# Patient Record
Sex: MALE | Race: WHITE
[De-identification: names, ages, dates, MRNs, and addresses within clinical notes are randomized per-mention and may not be internally consistent; named-entity substitution may affect disease eponyms.]

---

## 2020-01-31 ENCOUNTER — HOSPITAL ENCOUNTER (OUTPATIENT)
Dept: HOSPITAL 75 - RT | Age: 75
End: 2020-01-31
Attending: NURSE PRACTITIONER
Payer: OTHER GOVERNMENT

## 2020-01-31 DIAGNOSIS — Z72.0: ICD-10-CM

## 2020-01-31 DIAGNOSIS — J43.9: Primary | ICD-10-CM

## 2020-01-31 PROCEDURE — 94726 PLETHYSMOGRAPHY LUNG VOLUMES: CPT

## 2020-01-31 PROCEDURE — 94060 EVALUATION OF WHEEZING: CPT

## 2020-01-31 PROCEDURE — 94729 DIFFUSING CAPACITY: CPT

## 2020-02-04 ENCOUNTER — HOSPITAL ENCOUNTER (OUTPATIENT)
Dept: HOSPITAL 75 - RAD | Age: 75
End: 2020-02-04
Attending: NURSE PRACTITIONER
Payer: OTHER GOVERNMENT

## 2020-02-04 DIAGNOSIS — Z72.0: ICD-10-CM

## 2020-02-04 DIAGNOSIS — R05: ICD-10-CM

## 2020-02-04 DIAGNOSIS — J44.9: Primary | ICD-10-CM

## 2020-02-04 DIAGNOSIS — R91.8: ICD-10-CM

## 2020-02-04 NOTE — DIAGNOSTIC IMAGING REPORT
INDICATION: Lung mass and cough.



TECHNIQUE: Serum blood glucose level at the time of injection is

90 mg/dL. Patient was administered 15.3 mCi F-18 FDG

intravenously and PET imaging was performed from the top of skull

to mid thighs. Noncontrast CT was also performed for attenuation

correction and anatomic correlation.

All CT scans use one or more of the following dose optimizing

techniques: automated exposure control, MA and/or KvP adjustment

based on patient size and exam type or iterative reconstruction.



COMPARISON: No prior PET CT studies available for comparison.



FINDINGS: There is symmetric activity within the brain. Soft

tissues of the neck are unremarkable. There is a spiculated

hypermetabolic nodule in the medial left upper lobe measuring 9

mm in size. SUV max is 7.3 and this is concerning for small lung

neoplasm. There is a second tiny hypermetabolic nodule in the

left upper lobe slightly more inferior. This is just lateral to

the aortic arch and demonstrates some activity with SUV max of

4.1. The remainder of the lung fields are unremarkable. No

mediastinal or hilar hypermetabolism is identified. Imaging to

the abdomen and pelvis does show physiologic activity within the

gastrointestinal and genitourinary tracts. There is an area of

hypermetabolism in the left suprarenal location. This is lateral

to the left adrenal gland and posterior to the stomach and

slightly cephalad to the pancreatic tail. This demonstrates an

SUV max of 16.9. Ill-defined soft tissue is identified on the

noncontrast CT. No other suspicious foci are identified.



IMPRESSION:

1. 9 mm spiculated left upper lobe nodule demonstrating

hypermetabolism and suspicious for a small lung neoplasm. There

is a second 6 mm nodule in the left upper lobe that also

demonstrates hypermetabolism and this too may represent a tiny

neoplastic nodule.

2. Hypermetabolic focus in the left upper quadrant of the abdomen

in the suprarenal location, indeterminate. This is just lateral

to the left adrenal gland and slightly cephalad to the pancreatic

tail. Dedicated CT abdomen and pelvis would be recommended for

further evaluation.



Dictated by: 



  Dictated on workstation # [id number]

## 2020-02-05 ENCOUNTER — HOSPITAL ENCOUNTER (OUTPATIENT)
Dept: HOSPITAL 75 - PREOP | Age: 75
Discharge: HOME | End: 2020-02-05
Attending: INTERNAL MEDICINE
Payer: OTHER GOVERNMENT

## 2020-02-05 VITALS — WEIGHT: 140.21 LBS | BODY MASS INDEX: 21.25 KG/M2 | HEIGHT: 67.99 IN

## 2020-02-05 DIAGNOSIS — Z01.818: Primary | ICD-10-CM

## 2020-02-10 ENCOUNTER — HOSPITAL ENCOUNTER (OUTPATIENT)
Dept: HOSPITAL 75 - RAD FS | Age: 75
End: 2020-02-10
Attending: INTERNAL MEDICINE
Payer: OTHER GOVERNMENT

## 2020-02-10 DIAGNOSIS — K86.89: Primary | ICD-10-CM

## 2020-02-10 DIAGNOSIS — N28.1: ICD-10-CM

## 2020-02-10 PROCEDURE — 74178 CT ABD&PLV WO CNTR FLWD CNTR: CPT

## 2020-02-10 NOTE — DIAGNOSTIC IMAGING REPORT
PROCEDURE: 

CT abdomen and pelvis with and without contrast.



TECHNIQUE: 

Precontrast acquisitions were acquired through the abdomen and

pelvis. Multiple contiguous axial images were obtained through

the abdomen and pelvis after the administration of intravenous

contrast. Auto Exposure Controls were utilized during the CT exam

to meet ALARA standards for radiation dose reduction. 



INDICATION:  

Pancreatic mass.



FINDINGS: 

The lung bases are clear. The liver is normal in size without

focal lesions. The gallbladder is unremarkable. There is no

biliary ductal dilatation. The spleen is normal. There is a 2.3

cm low-density mass in the tail of the pancreas. The adrenal

glands are unremarkable. The kidneys are normal in appearance.

There is some atherosclerotic calcification of the aorta which is

otherwise nonaneurysmal. The bowel gas pattern is nonspecific.

There are small cysts in the kidneys. There is no free air. There

is no ascites. There are no focal inflammatory changes. There is

no pelvic mass, adenopathy, or free fluid. There are mild

degenerative changes in the spine.



IMPRESSION: 

There is a 2.3 cm low-density mass in the tail of the pancreas,

suspect for pancreatic neoplasm.



Small bilateral renal cysts.











Dictated by: 



  Dictated on workstation # [id number]

## 2020-04-14 LAB
ALBUMIN SERPL-MCNC: 3.4 GM/DL (ref 3.2–4.5)
ALP SERPL-CCNC: 60 U/L (ref 40–136)
ALT SERPL-CCNC: 52 U/L (ref 0–55)
BASOPHILS # BLD AUTO: 0.1 10^3/UL (ref 0–0.1)
BASOPHILS NFR BLD AUTO: 1 % (ref 0–10)
BILIRUB SERPL-MCNC: 0.5 MG/DL (ref 0.1–1)
BUN/CREAT SERPL: 16
CALCIUM SERPL-MCNC: 8.8 MG/DL (ref 8.5–10.1)
CHLORIDE SERPL-SCNC: 98 MMOL/L (ref 98–107)
CO2 SERPL-SCNC: 23 MMOL/L (ref 21–32)
CREAT SERPL-MCNC: 0.75 MG/DL (ref 0.6–1.3)
EOSINOPHIL # BLD AUTO: 0.1 10^3/UL (ref 0–0.3)
EOSINOPHIL NFR BLD AUTO: 1 % (ref 0–10)
ERYTHROCYTE [DISTWIDTH] IN BLOOD BY AUTOMATED COUNT: 21.7 % (ref 10–14.5)
GFR SERPLBLD BASED ON 1.73 SQ M-ARVRAT: > 60 ML/MIN
GLUCOSE SERPL-MCNC: 120 MG/DL (ref 70–105)
HCT VFR BLD CALC: 35 % (ref 40–54)
HGB BLD-MCNC: 12.1 G/DL (ref 13.3–17.7)
LYMPHOCYTES # BLD AUTO: 1.3 X 10^3 (ref 1–4)
LYMPHOCYTES NFR BLD AUTO: 10 % (ref 12–44)
MANUAL DIFFERENTIAL PERFORMED BLD QL: NO
MCH RBC QN AUTO: 28 PG (ref 25–34)
MCHC RBC AUTO-ENTMCNC: 34 G/DL (ref 32–36)
MCV RBC AUTO: 82 FL (ref 80–99)
MONOCYTES # BLD AUTO: 2.8 X 10^3 (ref 0–1)
MONOCYTES NFR BLD AUTO: 21 % (ref 0–12)
NEUTROPHILS # BLD AUTO: 9.2 X 10^3 (ref 1.8–7.8)
NEUTROPHILS NFR BLD AUTO: 68 % (ref 42–75)
PLATELET # BLD: 147 10^3/UL (ref 130–400)
PMV BLD AUTO: (no result) FL (ref 7.4–10.4)
POTASSIUM SERPL-SCNC: 4.3 MMOL/L (ref 3.6–5)
PROT SERPL-MCNC: 6.4 GM/DL (ref 6.4–8.2)
SODIUM SERPL-SCNC: 131 MMOL/L (ref 135–145)
WBC # BLD AUTO: 13.6 10^3/UL (ref 4.3–11)

## 2020-04-22 LAB
ALBUMIN SERPL-MCNC: 3.5 GM/DL (ref 3.2–4.5)
ALP SERPL-CCNC: 53 U/L (ref 40–136)
ALT SERPL-CCNC: 44 U/L (ref 0–55)
BASOPHILS # BLD AUTO: 0.1 10^3/UL (ref 0–0.1)
BASOPHILS NFR BLD AUTO: 0 % (ref 0–10)
BILIRUB SERPL-MCNC: 0.3 MG/DL (ref 0.1–1)
BUN/CREAT SERPL: 19
CALCIUM SERPL-MCNC: 9.3 MG/DL (ref 8.5–10.1)
CHLORIDE SERPL-SCNC: 98 MMOL/L (ref 98–107)
CO2 SERPL-SCNC: 27 MMOL/L (ref 21–32)
CREAT SERPL-MCNC: 0.73 MG/DL (ref 0.6–1.3)
EOSINOPHIL # BLD AUTO: 0.1 10^3/UL (ref 0–0.3)
EOSINOPHIL NFR BLD AUTO: 1 % (ref 0–10)
ERYTHROCYTE [DISTWIDTH] IN BLOOD BY AUTOMATED COUNT: 22.1 % (ref 10–14.5)
GFR SERPLBLD BASED ON 1.73 SQ M-ARVRAT: > 60 ML/MIN
GLUCOSE SERPL-MCNC: 68 MG/DL (ref 70–105)
HCT VFR BLD CALC: 36 % (ref 40–54)
HGB BLD-MCNC: 12.2 G/DL (ref 13.3–17.7)
LYMPHOCYTES # BLD AUTO: 1.9 X 10^3 (ref 1–4)
LYMPHOCYTES NFR BLD AUTO: 13 % (ref 12–44)
MANUAL DIFFERENTIAL PERFORMED BLD QL: NO
MCH RBC QN AUTO: 28 PG (ref 25–34)
MCHC RBC AUTO-ENTMCNC: 34 G/DL (ref 32–36)
MCV RBC AUTO: 84 FL (ref 80–99)
MONOCYTES # BLD AUTO: 3 X 10^3 (ref 0–1)
MONOCYTES NFR BLD AUTO: 20 % (ref 0–12)
NEUTROPHILS # BLD AUTO: 9.6 X 10^3 (ref 1.8–7.8)
NEUTROPHILS NFR BLD AUTO: 65 % (ref 42–75)
PLATELET # BLD: 135 10^3/UL (ref 130–400)
PMV BLD AUTO: (no result) FL (ref 7.4–10.4)
POTASSIUM SERPL-SCNC: 4.6 MMOL/L (ref 3.6–5)
PROT SERPL-MCNC: 6.8 GM/DL (ref 6.4–8.2)
SODIUM SERPL-SCNC: 134 MMOL/L (ref 135–145)
WBC # BLD AUTO: 14.7 10^3/UL (ref 4.3–11)

## 2020-04-23 ENCOUNTER — HOSPITAL ENCOUNTER (OUTPATIENT)
Dept: HOSPITAL 75 - SDC | Age: 75
Discharge: HOME | End: 2020-04-23
Attending: SURGERY
Payer: OTHER GOVERNMENT

## 2020-04-23 VITALS — SYSTOLIC BLOOD PRESSURE: 123 MMHG | DIASTOLIC BLOOD PRESSURE: 67 MMHG

## 2020-04-23 VITALS — SYSTOLIC BLOOD PRESSURE: 121 MMHG | DIASTOLIC BLOOD PRESSURE: 71 MMHG

## 2020-04-23 VITALS — BODY MASS INDEX: 20.01 KG/M2 | HEIGHT: 67.99 IN | WEIGHT: 132.06 LBS

## 2020-04-23 VITALS — SYSTOLIC BLOOD PRESSURE: 118 MMHG | DIASTOLIC BLOOD PRESSURE: 62 MMHG

## 2020-04-23 VITALS — DIASTOLIC BLOOD PRESSURE: 69 MMHG | SYSTOLIC BLOOD PRESSURE: 115 MMHG

## 2020-04-23 VITALS — DIASTOLIC BLOOD PRESSURE: 59 MMHG | SYSTOLIC BLOOD PRESSURE: 110 MMHG

## 2020-04-23 VITALS — DIASTOLIC BLOOD PRESSURE: 63 MMHG | SYSTOLIC BLOOD PRESSURE: 118 MMHG

## 2020-04-23 DIAGNOSIS — Z80.0: ICD-10-CM

## 2020-04-23 DIAGNOSIS — I87.2: ICD-10-CM

## 2020-04-23 DIAGNOSIS — Z90.89: ICD-10-CM

## 2020-04-23 DIAGNOSIS — C78.7: ICD-10-CM

## 2020-04-23 DIAGNOSIS — Z79.899: ICD-10-CM

## 2020-04-23 DIAGNOSIS — J43.9: ICD-10-CM

## 2020-04-23 DIAGNOSIS — F17.210: ICD-10-CM

## 2020-04-23 DIAGNOSIS — C25.9: Primary | ICD-10-CM

## 2020-04-23 PROCEDURE — 71045 X-RAY EXAM CHEST 1 VIEW: CPT

## 2020-04-23 PROCEDURE — 87081 CULTURE SCREEN ONLY: CPT

## 2020-04-23 NOTE — DISCHARGE INST-SIMPLE/STANDARD
Discharge Inst-Standard


Patient Instructions/Follow Up


Plan of Care/Instructions/FU:  


Ice pack on 15 min and off 30 min and repeat for discomfort and to reduce


swelling.





Phil 2-3 weeks.


Activity as Tolerated:  No


Discharge Diet:  Regular Diet


Other Inst to Patient


Follow up Appt:


Make appointment for 2 week.





Instructions:


No lifting greater than 10 pounds.


No strenuous activity. 


May shower in 24 hours, no tub bath or soaking.


Use incentive spirometer at home as directed.


No Smoking





Skin/Wound Care:


You have special glue over your incision that will fall off on it's own. 





Symptoms to Report:


Appetite Changes, Extremity Discoloration, Numbness/Tingling, Swelling 

Increased, Bleeding Excessive, Eyesight Changes, Pain Increased, Urine Color 

Change, Constipation(Persistent), Fever over 101 degree F, Pain/Pressure in 

chest, Urinating Difficulty, Cough Up/Vomit Blood, Heart Beat Irreg/Pounding, 

Pain/Pressure in jaw, Vaginal Bleeding Increase, Cramps in feet or legs, 

Lightheadedness, Pain/Pressure in shoulder, Diarrhea(Persistent), Memory Changes

Suddenly, Questions/Concerns, Weight gain consecutive days, Dizziness/Fainting, 

Nausea/Vomiting, Shortness of Breath, Weight gain over 2 pounds








If questions or concerns contact your physician 


Or seek help at emergency department.











RADHA CHOW DO              Apr 23, 2020 09:38

## 2020-04-23 NOTE — PROGRESS NOTE-POST OPERATIVE
Post-Operative Progess Note


Surgeon (s)/Assistant (s)


Surgeon


RADHA CHOW DO


Assistant:  na





Pre-Operative Diagnosis


pancreatic ca c mets





Post-Operative Diagnosis





same





Procedure & Operative Findings


Date of Procedure


4/23/20


Procedure Performed/Findings





PROCEDURE:


Right internal jugular port placement using ultrasound guidance. 


 


COMPLICATIONS:


None. 


 


INDICATIONS:


The patient is a 75 year old male with metastatic pancreatic cancer. 


Patient understands the risks and benefits of port


placement and wished to proceed with the procedure. Consent was


signed on the chart. 


 


PROCEDURE:


The patient was taken to the operating suite, was prepped and


draped in the sterile fashion. A surgical pause was performed.


Ultrasound was used to locate the internal jugular vein.  Once


located anesthetic was infiltrated above it.  Using  micro-access


kit, the right internal vein was accessed. Dark nonpulsatile


blood was withdrawn. The wire was inserted.  Fluoroscopy assured


proper placement. The needle was removed.  The micro-access


dilator was advanced over the wire and the wire was removed. The


regular wire was inserted and fluoroscopy assured proper


placement. The wire was then secured.  Local anesthetic was used


to anesthetize from the neck for tunneling down to the right


chest and for pocket creation. A 15 blade scalpel was used to


make an incision over the right chest. Cautery was used to


dissect down to the pectoral fascia. A pocket was created with


blunt dissection. The dilator sheath was then advanced over the


wire under fluoroscopy and the dilator and wire were removed. The


Groshong catheter was inserted through the sheath and the sheath


was then removed. The Groshong wire was removed. The catheter was


then tunneled to the right chest pocket. Fluoroscopy was used to


cut to length and this was then attached to the port which was


then placed within the pocket. The port was then accessed without


difficulty. It was then flushed with saline and then heparin. The


subcutaneous tissues were then reapproximated using 3-0 Vicryl.


The areas were then washed and dried. Skin Affix was placed over incision.


The insertion point of the neck Skin Affix was


placed over the incision. The patient tolerated the procedure


well without complication and was taken to recovery room in


stable condition. Chest x-ray is pending.


Anesthesia Type


mac local





Estimated Blood Loss


Estimated blood loss (mL):  min





Specimens/Packing


Specimens Removed


RADHA Leung DO              Apr 23, 2020 09:45

## 2020-04-23 NOTE — PROGRESS NOTE-PRE OPERATIVE
Pre-Operative Progress Note


H&P Reviewed


The H&P was reviewed, patient examined and no changes noted.


Date Seen by Provider:  Apr 23, 2020


Time Seen by Provider:  08:39


Date H&P Reviewed:  Apr 23, 2020


Time H&P Reviewed:  08:39


Pre-Operative Diagnosis:  pancreatic ca c mets











RADHA CHOW DO              Apr 23, 2020 08:49

## 2020-04-23 NOTE — DIAGNOSTIC IMAGING REPORT
INDICATION: Port placement



COMPARISON is made study of 02/06/2020



Single AP view of the chest reveals heart size and pulmonary

vascularity within normal limits. There is no evidence of

pneumothorax. There is mild blunting of left costophrenic sulcus.

Occasional calcified granulomas are seen. Right anterior chest

wall port is in place with catheter tip reaching the upper

superior vena cava.



IMPRESSION: There maybe a small amount of left pleural fluid.

Otherwise, there is no evidence of acute abnormality or

complication post port placement.



Dictated by: 



  Dictated on workstation # [id number]

## 2020-04-23 NOTE — XMS REPORT
Continuity of Care Document

                             Created on: 2020



MAY DEAN

External Reference #: [id number]

: 1945

Sex: Male



Demographics





                          Address                   [address] [place] [address]  [id number]

 

                          Home Phone                [phone] x

 

                          Preferred Language        Unknown

 

                          Marital Status            Unknown

 

                          Confucianist Affiliation     Unknown

 

                          Race                      Unknown

 

                          Ethnic Group              Unknown





Author





                          Organization              Unknown

 

                          Address                   Unknown

 

                          Phone                     Unavailable



              



Allergies

      



             Active           Description           Code           Type         

  Severity   

                Reaction           Onset           Reported/Identified          

 

Relationship to Patient                 Clinical Status        

 

                Yes             No Known Drug Allergies           [id number]    

       Drug 

Allergy           Unknown           N/A                             2020  

      

                                                             



                  



Medications

      



There is no data.                  



Problems

      



             Date Dx Coded           Attending           Type           Code    

       

Diagnosis                               Diagnosed By        

 

                2020           GIBRAN MUHAMMAD DO           Ot            

  Z01.818          

                          ENCOUNTER FOR OTHER PREPROCEDURAL EXAMIN              

      

 

                2020           KINGSLEY LILLI E APRN           Ot      

        J44.9      

                          CHRONIC OBSTRUCTIVE PULMONARY DISEASE, U              

      

 

                2020           KINGSLEY, LILLI E APRN           Ot      

        R05        

                          COUGH                              

 

                2020           KINGSLEY, LILLI E APRN           Ot      

        R91.8      

                          OTHER NONSPECIFIC ABNORMAL FINDING OF [place]              

      

 

                2020           KINGSLEY, LILLI E APRN           Ot      

        Z72.0      

                          TOBACCO USE                        

 

                2020           KINGSLEY, LILLI E APRN           Ot      

        J43.9      

                          EMPHYSEMA, UNSPECIFIED                    

 

                2020           KINGSLEY, LILLI E APRN           Ot      

        Z72.0      

                          TOBACCO USE                        

 

                2020           KINGSLEY, LILLI E APRN           Ot      

        J43.9      

                          EMPHYSEMA, UNSPECIFIED                    

 

                2020           KINGSLEY, LILLI E APRN           Ot      

        Z72.0      

                          TOBACCO USE                        

 

                2020           KINGSLEY, LILLI E APRN           Ot      

        J44.9      

                          CHRONIC OBSTRUCTIVE PULMONARY DISEASE, U              

      

 

                2020           KINGSLEY, LILLI E APRN           Ot      

        R05        

                          COUGH                              

 

                2020           KINGSLEY, LILLI E APRN           Ot      

        R91.8      

                          OTHER NONSPECIFIC ABNORMAL FINDING OF [place]              

      

 

                2020           KINGSLEY, LILLI E APRN           Ot      

        Z72.0      

                          TOBACCO USE                        

 

                2020           GIBRAN MUHAMMAD DO           Ot            

  Z01.818          

                          ENCOUNTER FOR OTHER PREPROCEDURAL EXAMIN              

      

 

                2020           GIBRAN MUHAMMAD DO           Ot            

  F17.210          

                          NICOTINE DEPENDENCE, CIGARETTES, UNCOMPL              

      

 

             2020           GIBRAN MUHAMMAD DO           Ot           J44.

9           

CHRONIC OBSTRUCTIVE PULMONARY DISEASE, U                    

 

             2020           GIBRAN MUHAMMAD DO           Ot           R05 

          

COUGH                                            

 

                2020           GIBRAN MUHAMMAD DO           Ot            

  Z79.899          

                          OTHER LONG TERM (CURRENT) DRUG THERAPY                

    

 

             2020           GIBRAN MUHAMMAD DO           Ot           K86.

89           

OTHER SPECIFIED DISEASES OF PANCREAS                    

 

             2020           GIBRAN MUHAMMAD DO           Ot           N28.

1           

CYST OF KIDNEY, ACQUIRED                         

 

                2020           GIBRAN MUHAMMAD DO           Ot            

  F17.210          

                          NICOTINE DEPENDENCE, CIGARETTES, UNCOMPL              

      

 

             2020           GIBRAN MUHAMMAD DO           Ot           J44.

9           

CHRONIC OBSTRUCTIVE PULMONARY DISEASE, U                    

 

             2020           JB DO, GIBRAN JIN           Ot           R05 

          

COUGH                                            

 

                2020           JB DO, GIBRAN JIN           Ot            

  Z79.899          

                          OTHER LONG TERM (CURRENT) DRUG THERAPY                

    

 

                2020           GIBRAN MUHAMMAD DO           Ot            

  F17.210          

                          NICOTINE DEPENDENCE, CIGARETTES, UNCOMPL              

      

 

             2020           GIBRAN MUHAMMAD DO           Ot           J44.

9           

CHRONIC OBSTRUCTIVE PULMONARY DISEASE, U                    

 

             2020           GIBRAN MUHAMMAD DO           Ot           R05 

          

COUGH                                            

 

                2020           GIBRAN MUHAMMAD DO           Ot            

  Z79.899          

                          OTHER LONG TERM (CURRENT) DRUG THERAPY                

    

 

             2020           AUSTIN VILLAFANA           Ot           C25.2  

         

MALIGNANT NEOPLASM OF TAIL OF PANCREAS                    

 

             2020           AUSTIN VILLAFANA           Ot           D64.9  

         

ANEMIA, UNSPECIFIED                              

 

             2020           AUSTIN VILLAFANA           Ot           D69.6  

         

THROMBOCYTOPENIA, UNSPECIFIED                    

 

             2020           AUSTIN VILLAFANA           Ot           K22.70 

          

KENNEY'S ESOPHAGUS WITHOUT DYSPLASIA                    

 

             2020           AUSTIN VILLAFANA           Ot           R92.8  

         OTH 

ABN AND INCONCLUSIVE FINDINGS ON DX                     

 

             2020           AUSTIN VILLAFANA           Ot           Z72.0  

         

TOBACCO USE                                      

 

                2020           GIBRAN MUHAMMAD DO           Ot            

  F17.210          

                          NICOTINE DEPENDENCE, CIGARETTES, UNCOMPL              

      

 

             2020           GIBRAN MUHAMMAD DO           Ot           J44.

9           

CHRONIC OBSTRUCTIVE PULMONARY DISEASE, U                    

 

             2020           GIBRAN MUHAMMAD DO           Ot           R05 

          

COUGH                                            

 

                2020           JB DOGIBRAN           Ot            

  Z79.899          

                          OTHER LONG TERM (CURRENT) DRUG THERAPY                

    

 

                2020           GIBRAN MUHAMMAD DO           Ot            

  F17.210          

                          NICOTINE DEPENDENCE, CIGARETTES, UNCOMPL              

      

 

             2020           GIBRAN MUHAMMAD DO           Ot           J44.

9           

CHRONIC OBSTRUCTIVE PULMONARY DISEASE, U                    

 

             2020           JBGIBRAN HEWITT DO           Ot           R05 

          

COUGH                                            

 

                2020           JB BROWNINGGIBRAN           Ot            

  Z79.899          

                          OTHER LONG TERM (CURRENT) DRUG THERAPY                

    



                                                                                
                   



Procedures

      



There is no data.                  



Results

      



                    Test                Result              Range        

 

                                        Complete blood count (CBC) with automate

d white blood cell (WBC) differential - 

20 08:47         

 

                          Blood leukocytes automated count (number/volume)      

     6.2 10*3/uL          

                                        4.3-11.0        

 

                          Blood erythrocytes automated count (number/volume)    

       4.15 10*6/uL       

                                        4.35-5.85        

 

                    Venous blood hemoglobin measurement (mass/volume)           

11.9 g/dL           

13.3-17.7        

 

                    Blood hematocrit (volume fraction)           35 %           

     40-54        

 

                    Automated erythrocyte mean corpuscular volume           85 [

foz_us]           

80-99        

 

                                        Automated erythrocyte mean corpuscular h

emoglobin (mass per erythrocyte)        

                          29 pg                     25-34        

 

                                        Automated erythrocyte mean corpuscular h

emoglobin concentration measurement 

(mass/volume)             34 g/dL                   32-36        

 

                    Automated erythrocyte distribution width ratio           22.

9 %              10.0-

14.5        

 

                    Automated blood platelet count (count/volume)           69 1

0*3/uL           

130-400        

 

                    Automated blood neutrophils/100 leukocytes           51 %   

             42-75       

 

 

                    Automated blood lymphocytes/100 leukocytes           26 %   

             12-44       

 

 

                    Blood monocytes/100 leukocytes           19 %               

 0-12        

 

                    Automated blood eosinophils/100 leukocytes           3 %    

             0-10        

 

                    Automated blood basophils/100 leukocytes           1 %      

           0-10        

 

                    Blood neutrophils automated count (number/volume)           

3.2 10*3            

1.8-7.8        

 

                    Blood lymphocytes automated count (number/volume)           

1.6 10*3            

1.0-4.0        

 

                    Blood monocytes automated count (number/volume)           1.

2 10*3            

0.0-1.0        

 

                    Automated eosinophil count           0.2 10*3/uL           0

.0-0.3        

 

                    Automated blood basophil count (count/volume)           0.1 

10*3/uL           

0.0-0.1        

 

                                        Comprehensive metabolic panel - 20

 08:47         

 

                          Serum or plasma sodium measurement (moles/volume)     

      139 mmol/L          

                                        135-145        

 

                          Serum or plasma potassium measurement (moles/volume)  

         4.3 mmol/L       

                                        3.6-5.0        

 

                          Serum or plasma chloride measurement (moles/volume)   

        107 mmol/L        

                                                

 

                    Carbon dioxide           26 mmol/L           21-32        

 

                          Serum or plasma anion gap determination (moles/volume)

           6 mmol/L       

                                        5-14        

 

                          Serum or plasma urea nitrogen measurement (mass/volume

)           16 mg/dL      

                                        7-18        

 

                          Serum or plasma creatinine measurement (mass/volume)  

         0.87 mg/dL       

                                        0.60-1.30        

 

                    Serum or plasma urea nitrogen/creatinine mass ratio         

  18                  NRG 

       

 

                                        Serum or plasma creatinine measurement w

ith calculation of estimated glomerular 

filtration rate           >                         NRG        

 

                    Serum or plasma glucose measurement (mass/volume)           

93 mg/dL            

        

 

                    Serum or plasma calcium measurement (mass/volume)           

9.0 mg/dL           

8.5-10.1        

 

                          Serum or plasma total bilirubin measurement (mass/volu

me)           0.4 mg/dL   

                                        0.1-1.0        

 

                                        Serum or plasma alkaline phosphatase leah

surement (enzymatic activity/volume)    

                          48 U/L                            

 

                                        Serum or plasma aspartate aminotransfera

se measurement (enzymatic 

activity/volume)           15 U/L                    5-34        

 

                                        Serum or plasma alanine aminotransferase

 measurement (enzymatic activity/volume)

                          16 U/L                    0-55        

 

                    Serum or plasma protein measurement (mass/volume)           

6.2 g/dL            

6.4-8.2        

 

                    Serum or plasma albumin measurement (mass/volume)           

3.7 g/dL            

3.2-4.5        

 

                    CALCIUM CORRECTED           9.2 mg/dL           8.5-10.1    

    

 

                                        Serum or plasma amylase measurement (enz

ymatic activity/volume) - 20 08:47

         

 

                          Serum or plasma amylase measurement (enzymatic activit

y/volume)           32 U/L

                                                

 

                                        Lipase - 20 08:47         

 

                    Lipase              16 U/L              8-78        

 

                                        Manual absolute plasma cell count -  08:47         

 

                    Blood monocytes/100 leukocytes           17 %               

 NRG        

 

                    Manual blood segmented neutrophils/100 leukocytes           

64 %                NRG  

      

 

                    Blood band neutrophils/100 leukocytes           0 %         

        NRG        

 

                    Manual blood lymphocytes/100 leukocytes           12 %      

          NRG        

 

                    Manual eosinophils/100 leukocytes in nose           2 %     

            NRG        

 

                    Manual blood basophils/100 leukocytes           0 %         

        NRG        

 

                    Blood lymphocytes variant/100 leukocytes           5 %      

           NRG        

 

                    Blood anisocytosis detection by light microscopy           M

ARKED              NRG

        

 

                    Blood target cells detection by light microscopy           M

ODERATE            

NRG        

 

                    Blood spherocytes detection by light microscopy           SL

IGHT              NRG 

       

 

                    Blood schistocytes detection by light microscopy           S

LIGHT              NRG

        

 

                                        CA 19-9 - 02/06/20 08:47         

 

                    CA 19-9 C           3488 u[iU]/mL           0-37        

 

                                        Sputum Gram stain - 20 09:30      

   

 

                    Sputum Gram stain           relevant, interpret with caution

.            NRG    

    

 

                                        Bacteria identification in bronchial spe

cimen by aerobe culture - 20 09:30

         

 

                    QUANTITY OF GROWTH           .                   NRG        

 

                          Bacteria identification in bronchial specimen by aerob

e culture           

[id number]                                NRG        

 

                    FTX;REPORTABLE           OBSERVED ON PLATES AT [facility]          

  NRG        

 

                                        Mycobacterium species detection by organ

ism specific culture - 20 09:30   

      

 

                    QUANTITY OF GROWTH           FEW                 NRG        

 

                          Mycobacterium species detection by organism specific c

ulture           [id number] 

                                        NRG        

 

                                        Fungus culture - 20 09:31         

 

                    QUANTITY OF GROWTH           Rare                NRG        

 

                    Fungus culture           [id number]             NRG        



                                  



Encounters

      



                ACCT No.           Visit Date/Time           Discharge          

 Status         

             Pt. Type           Provider           Facility           Loc./Unit 

          

Complaint        

 

                    [id number]           02/10/2020 08:49:00           02/10/2

020 23:59:59        

                CLS             Outpatient           GIBRAN MUHAMMAD DO         

  [facility]           RAD FS                    PANCREATIC MASS        

 

                    [id number]           2020 07:40:00           

020 11:05:00        

                DIS             Outpatient           GIBRAN MUHAMMAD DO         

  [facility]           ENDO                      DYSPNEA/COUGH/COPD/OTHE

R 

NONSPECIFIC ABNORMAL FIND        

 

                    [id number]           2020 05:46:00            12:16:00        

                DIS             Outpatient           GIBRAN MUHAMMAD DO         

  [facility]           PREOP                     BRONCHOSCOPY        

 

                    [id number]           2020 09:17:00            23:59:59        

                CLS             Outpatient           LILLI WYNN APRN   

        [facility]           RAD                       COUGH,TOBACCO U

SER        

 

                    [id number]           2020 12:13:00           

020 23:59:59        

                CLS             Outpatient           LILLI WYNN APRN   

        [facility]           RT                        COUGH,TOBACCO U

SER        

 

             [id number]           2020 14:33:00                        A

CT           

Outpatient                AUSTIN VILLAFANA           [facility] 

                          ONC

## 2020-04-23 NOTE — ANESTHESIA-GENERAL POST-OP
MAC


Patient Condition


Mental Status/LOC:  Same as Preop


Cardiovascular:  Satisfactory


Nausea/Vomiting:  Absent


Respiratory:  Satisfactory


Pain:  Controlled


Complications:  Present





Post Op Complications


Complications


Pt had an episode of vomiting during procedure.  Airway was suctioned 

immediately and head was put in reverse T.  Advised pt via telephone after 

procedure of events and to follow up with primary care if fever/malaise or cough

in next 24 to 48 hours.  Pt understood instructions.





Follow Up Care/Instructions


Patient Instructions


None needed.





Anesthesiology Discharge Order


Discharge Order


Patient is doing well, no complaints, stable vital signs.











JOHN SAN CRNA           Apr 23, 2020 11:08

## 2020-04-23 NOTE — DIAGNOSTIC IMAGING REPORT
INDICATION: PowerPort placement



COMPARISON: Unavailable



TECHNIQUE: Single intraoperative image of the upper chest is

obtained dated 04/23/2020.



FINDINGS: Intraoperative imaging demonstrates a right-sided

Port-A-Cath in place with the distal tip extending into the

superior vena cava where the distal tip is not optimally

visualized.



IMPRESSION: 



Intraoperative imaging from placement of a right-sided

Port-A-Cath.



Recommend dedicated radiographs of the chest at the completion of

the exam to evaluate the entire catheter and to evaluate for any

possible postprocedural complication.



See surgical note for full details.



Fluoroscopy time: 32.8 seconds.



Dictated by: 



  Dictated on workstation # [id number]

## 2020-05-06 LAB
ALBUMIN SERPL-MCNC: 3.6 GM/DL (ref 3.2–4.5)
ALP SERPL-CCNC: 47 U/L (ref 40–136)
ALT SERPL-CCNC: 33 U/L (ref 0–55)
BASOPHILS # BLD AUTO: 0 10^3/UL (ref 0–0.1)
BASOPHILS NFR BLD AUTO: 1 % (ref 0–10)
BILIRUB SERPL-MCNC: 0.5 MG/DL (ref 0.1–1)
BUN/CREAT SERPL: 17
CALCIUM SERPL-MCNC: 9 MG/DL (ref 8.5–10.1)
CHLORIDE SERPL-SCNC: 102 MMOL/L (ref 98–107)
CO2 SERPL-SCNC: 23 MMOL/L (ref 21–32)
CREAT SERPL-MCNC: 0.65 MG/DL (ref 0.6–1.3)
EOSINOPHIL # BLD AUTO: 0 10^3/UL (ref 0–0.3)
EOSINOPHIL NFR BLD AUTO: 0 % (ref 0–10)
ERYTHROCYTE [DISTWIDTH] IN BLOOD BY AUTOMATED COUNT: 23.6 % (ref 10–14.5)
GFR SERPLBLD BASED ON 1.73 SQ M-ARVRAT: > 60 ML/MIN
GLUCOSE SERPL-MCNC: 88 MG/DL (ref 70–105)
HCT VFR BLD CALC: 33 % (ref 40–54)
HGB BLD-MCNC: 11.1 G/DL (ref 13.3–17.7)
LYMPHOCYTES # BLD AUTO: 1.7 X 10^3 (ref 1–4)
LYMPHOCYTES NFR BLD AUTO: 21 % (ref 12–44)
MANUAL DIFFERENTIAL PERFORMED BLD QL: NO
MCH RBC QN AUTO: 28 PG (ref 25–34)
MCHC RBC AUTO-ENTMCNC: 34 G/DL (ref 32–36)
MCV RBC AUTO: 84 FL (ref 80–99)
MONOCYTES # BLD AUTO: 1.4 X 10^3 (ref 0–1)
MONOCYTES NFR BLD AUTO: 17 % (ref 0–12)
NEUTROPHILS # BLD AUTO: 4.9 X 10^3 (ref 1.8–7.8)
NEUTROPHILS NFR BLD AUTO: 61 % (ref 42–75)
PLATELET # BLD: 114 10^3/UL (ref 130–400)
PMV BLD AUTO: (no result) FL (ref 7.4–10.4)
POTASSIUM SERPL-SCNC: 4.4 MMOL/L (ref 3.6–5)
PROT SERPL-MCNC: 6.4 GM/DL (ref 6.4–8.2)
SODIUM SERPL-SCNC: 134 MMOL/L (ref 135–145)
WBC # BLD AUTO: 8.1 10^3/UL (ref 4.3–11)

## 2020-05-13 LAB
BASOPHILS # BLD AUTO: 0 10^3/UL (ref 0–0.1)
BASOPHILS NFR BLD AUTO: 1 % (ref 0–10)
BUN/CREAT SERPL: 16
CALCIUM SERPL-MCNC: 8.7 MG/DL (ref 8.5–10.1)
CHLORIDE SERPL-SCNC: 104 MMOL/L (ref 98–107)
CO2 SERPL-SCNC: 23 MMOL/L (ref 21–32)
CREAT SERPL-MCNC: 0.68 MG/DL (ref 0.6–1.3)
EOSINOPHIL # BLD AUTO: 0 10^3/UL (ref 0–0.3)
EOSINOPHIL NFR BLD AUTO: 0 % (ref 0–10)
ERYTHROCYTE [DISTWIDTH] IN BLOOD BY AUTOMATED COUNT: 23 % (ref 10–14.5)
GFR SERPLBLD BASED ON 1.73 SQ M-ARVRAT: > 60 ML/MIN
GLUCOSE SERPL-MCNC: 110 MG/DL (ref 70–105)
HCT VFR BLD CALC: 29 % (ref 40–54)
HGB BLD-MCNC: 9.9 G/DL (ref 13.3–17.7)
LYMPHOCYTES # BLD AUTO: 1.1 X 10^3 (ref 1–4)
LYMPHOCYTES NFR BLD AUTO: 39 % (ref 12–44)
MANUAL DIFFERENTIAL PERFORMED BLD QL: NO
MCH RBC QN AUTO: 29 PG (ref 25–34)
MCHC RBC AUTO-ENTMCNC: 34 G/DL (ref 32–36)
MCV RBC AUTO: 85 FL (ref 80–99)
MONOCYTES # BLD AUTO: 0.4 X 10^3 (ref 0–1)
MONOCYTES NFR BLD AUTO: 12 % (ref 0–12)
NEUTROPHILS # BLD AUTO: 1.4 X 10^3 (ref 1.8–7.8)
NEUTROPHILS NFR BLD AUTO: 48 % (ref 42–75)
PLATELET # BLD: 44 10^3/UL (ref 130–400)
PMV BLD AUTO: (no result) FL (ref 7.4–10.4)
POTASSIUM SERPL-SCNC: 4.1 MMOL/L (ref 3.6–5)
SODIUM SERPL-SCNC: 135 MMOL/L (ref 135–145)
WBC # BLD AUTO: 2.9 10^3/UL (ref 4.3–11)

## 2020-05-20 LAB
BASOPHILS # BLD AUTO: 0 10^3/UL (ref 0–0.1)
BASOPHILS NFR BLD AUTO: 0 % (ref 0–10)
BUN/CREAT SERPL: 18
CALCIUM SERPL-MCNC: 9.2 MG/DL (ref 8.5–10.1)
CHLORIDE SERPL-SCNC: 104 MMOL/L (ref 98–107)
CO2 SERPL-SCNC: 23 MMOL/L (ref 21–32)
CREAT SERPL-MCNC: 0.74 MG/DL (ref 0.6–1.3)
EOSINOPHIL # BLD AUTO: 0 10^3/UL (ref 0–0.3)
EOSINOPHIL NFR BLD AUTO: 1 % (ref 0–10)
ERYTHROCYTE [DISTWIDTH] IN BLOOD BY AUTOMATED COUNT: 23.7 % (ref 10–14.5)
GFR SERPLBLD BASED ON 1.73 SQ M-ARVRAT: > 60 ML/MIN
GLUCOSE SERPL-MCNC: 98 MG/DL (ref 70–105)
HCT VFR BLD CALC: 32 % (ref 40–54)
HGB BLD-MCNC: 10.7 G/DL (ref 13.3–17.7)
LYMPHOCYTES # BLD AUTO: 1.5 X 10^3 (ref 1–4)
LYMPHOCYTES NFR BLD AUTO: 23 % (ref 12–44)
MANUAL DIFFERENTIAL PERFORMED BLD QL: NO
MCH RBC QN AUTO: 29 PG (ref 25–34)
MCHC RBC AUTO-ENTMCNC: 33 G/DL (ref 32–36)
MCV RBC AUTO: 86 FL (ref 80–99)
MONOCYTES # BLD AUTO: 1.8 X 10^3 (ref 0–1)
MONOCYTES NFR BLD AUTO: 27 % (ref 0–12)
NEUTROPHILS # BLD AUTO: 3.2 X 10^3 (ref 1.8–7.8)
NEUTROPHILS NFR BLD AUTO: 49 % (ref 42–75)
PLATELET # BLD: 68 10^3/UL (ref 130–400)
PMV BLD AUTO: (no result) FL (ref 7.4–10.4)
POTASSIUM SERPL-SCNC: 4.3 MMOL/L (ref 3.6–5)
SODIUM SERPL-SCNC: 136 MMOL/L (ref 135–145)
WBC # BLD AUTO: 6.5 10^3/UL (ref 4.3–11)

## 2020-05-27 ENCOUNTER — HOSPITAL ENCOUNTER (OUTPATIENT)
Dept: HOSPITAL 75 - ONC | Age: 75
LOS: 5 days | Discharge: HOME | End: 2020-06-01
Attending: INTERNAL MEDICINE
Payer: OTHER GOVERNMENT

## 2020-05-27 VITALS — BODY MASS INDEX: 20.31 KG/M2 | WEIGHT: 134 LBS | HEIGHT: 68 IN

## 2020-05-27 DIAGNOSIS — D69.6: ICD-10-CM

## 2020-05-27 DIAGNOSIS — C25.2: ICD-10-CM

## 2020-05-27 DIAGNOSIS — Z51.11: Primary | ICD-10-CM

## 2020-05-27 DIAGNOSIS — R92.8: ICD-10-CM

## 2020-05-27 DIAGNOSIS — D64.9: ICD-10-CM

## 2020-05-27 DIAGNOSIS — Z72.0: ICD-10-CM

## 2020-05-27 DIAGNOSIS — K22.70: ICD-10-CM

## 2020-05-27 LAB
ALBUMIN SERPL-MCNC: 3.7 GM/DL (ref 3.2–4.5)
ALP SERPL-CCNC: 59 U/L (ref 40–136)
ALT SERPL-CCNC: 16 U/L (ref 0–55)
BASOPHILS # BLD AUTO: 0.1 10^3/UL (ref 0–0.1)
BASOPHILS NFR BLD AUTO: 1 % (ref 0–10)
BILIRUB SERPL-MCNC: 0.4 MG/DL (ref 0.1–1)
BUN/CREAT SERPL: 18
CALCIUM SERPL-MCNC: 9 MG/DL (ref 8.5–10.1)
CHLORIDE SERPL-SCNC: 103 MMOL/L (ref 98–107)
CO2 SERPL-SCNC: 24 MMOL/L (ref 21–32)
CREAT SERPL-MCNC: 0.72 MG/DL (ref 0.6–1.3)
EOSINOPHIL # BLD AUTO: 0 10^3/UL (ref 0–0.3)
EOSINOPHIL NFR BLD AUTO: 0 % (ref 0–10)
ERYTHROCYTE [DISTWIDTH] IN BLOOD BY AUTOMATED COUNT: 23.7 % (ref 10–14.5)
GFR SERPLBLD BASED ON 1.73 SQ M-ARVRAT: > 60 ML/MIN
GLUCOSE SERPL-MCNC: 91 MG/DL (ref 70–105)
HCT VFR BLD CALC: 33 % (ref 40–54)
HGB BLD-MCNC: 10.7 G/DL (ref 13.3–17.7)
LYMPHOCYTES # BLD AUTO: 1.8 X 10^3 (ref 1–4)
LYMPHOCYTES NFR BLD AUTO: 22 % (ref 12–44)
MANUAL DIFFERENTIAL PERFORMED BLD QL: NO
MCH RBC QN AUTO: 29 PG (ref 25–34)
MCHC RBC AUTO-ENTMCNC: 33 G/DL (ref 32–36)
MCV RBC AUTO: 87 FL (ref 80–99)
MONOCYTES # BLD AUTO: 1.6 X 10^3 (ref 0–1)
MONOCYTES NFR BLD AUTO: 19 % (ref 0–12)
NEUTROPHILS # BLD AUTO: 4.8 X 10^3 (ref 1.8–7.8)
NEUTROPHILS NFR BLD AUTO: 58 % (ref 42–75)
PLATELET # BLD: 73 10^3/UL (ref 130–400)
PMV BLD AUTO: (no result) FL (ref 7.4–10.4)
POTASSIUM SERPL-SCNC: 4.5 MMOL/L (ref 3.6–5)
PROT SERPL-MCNC: 6.7 GM/DL (ref 6.4–8.2)
SODIUM SERPL-SCNC: 135 MMOL/L (ref 135–145)
WBC # BLD AUTO: 8.3 10^3/UL (ref 4.3–11)

## 2020-05-27 PROCEDURE — 96375 TX/PRO/DX INJ NEW DRUG ADDON: CPT

## 2020-05-27 PROCEDURE — 85025 COMPLETE CBC W/AUTO DIFF WBC: CPT

## 2020-05-27 PROCEDURE — 86301 IMMUNOASSAY TUMOR CA 19-9: CPT

## 2020-05-27 PROCEDURE — 96417 CHEMO IV INFUS EACH ADDL SEQ: CPT

## 2020-05-27 PROCEDURE — 99213 OFFICE O/P EST LOW 20 MIN: CPT

## 2020-05-27 PROCEDURE — 80048 BASIC METABOLIC PNL TOTAL CA: CPT

## 2020-05-27 PROCEDURE — 80053 COMPREHEN METABOLIC PANEL: CPT

## 2020-05-27 PROCEDURE — 96413 CHEMO IV INFUSION 1 HR: CPT

## 2020-05-27 PROCEDURE — 36415 COLL VENOUS BLD VENIPUNCTURE: CPT

## 2020-05-27 PROCEDURE — 99214 OFFICE O/P EST MOD 30 MIN: CPT

## 2020-05-27 PROCEDURE — 36591 DRAW BLOOD OFF VENOUS DEVICE: CPT

## 2020-06-03 LAB
BASOPHILS # BLD AUTO: 0 10^3/UL (ref 0–0.1)
BASOPHILS NFR BLD AUTO: 1 % (ref 0–10)
BUN/CREAT SERPL: 17
CALCIUM SERPL-MCNC: 8.7 MG/DL (ref 8.5–10.1)
CHLORIDE SERPL-SCNC: 104 MMOL/L (ref 98–107)
CO2 SERPL-SCNC: 22 MMOL/L (ref 21–32)
CREAT SERPL-MCNC: 0.75 MG/DL (ref 0.6–1.3)
EOSINOPHIL # BLD AUTO: 0 10^3/UL (ref 0–0.3)
EOSINOPHIL NFR BLD AUTO: 0 % (ref 0–10)
ERYTHROCYTE [DISTWIDTH] IN BLOOD BY AUTOMATED COUNT: 22.4 % (ref 10–14.5)
GFR SERPLBLD BASED ON 1.73 SQ M-ARVRAT: > 60 ML/MIN
GLUCOSE SERPL-MCNC: 85 MG/DL (ref 70–105)
HCT VFR BLD CALC: 28 % (ref 40–54)
HGB BLD-MCNC: 9.3 G/DL (ref 13.3–17.7)
LYMPHOCYTES # BLD AUTO: 1.2 X 10^3 (ref 1–4)
LYMPHOCYTES NFR BLD AUTO: 38 % (ref 12–44)
MANUAL DIFFERENTIAL PERFORMED BLD QL: NO
MCH RBC QN AUTO: 29 PG (ref 25–34)
MCHC RBC AUTO-ENTMCNC: 33 G/DL (ref 32–36)
MCV RBC AUTO: 87 FL (ref 80–99)
MONOCYTES # BLD AUTO: 0.4 X 10^3 (ref 0–1)
MONOCYTES NFR BLD AUTO: 13 % (ref 0–12)
NEUTROPHILS # BLD AUTO: 1.5 X 10^3 (ref 1.8–7.8)
NEUTROPHILS NFR BLD AUTO: 48 % (ref 42–75)
PLATELET # BLD: 27 10^3/UL (ref 130–400)
PMV BLD AUTO: (no result) FL (ref 7.4–10.4)
POTASSIUM SERPL-SCNC: 4.3 MMOL/L (ref 3.6–5)
SODIUM SERPL-SCNC: 135 MMOL/L (ref 135–145)
WBC # BLD AUTO: 3.1 10^3/UL (ref 4.3–11)

## 2020-06-10 LAB
BASOPHILS # BLD AUTO: 0.1 10^3/UL (ref 0–0.1)
BASOPHILS NFR BLD AUTO: 2 % (ref 0–10)
BUN/CREAT SERPL: 18
CALCIUM SERPL-MCNC: 9.4 MG/DL (ref 8.5–10.1)
CHLORIDE SERPL-SCNC: 100 MMOL/L (ref 98–107)
CO2 SERPL-SCNC: 25 MMOL/L (ref 21–32)
CREAT SERPL-MCNC: 0.85 MG/DL (ref 0.6–1.3)
EOSINOPHIL # BLD AUTO: 0.1 10^3/UL (ref 0–0.3)
EOSINOPHIL NFR BLD AUTO: 1 % (ref 0–10)
ERYTHROCYTE [DISTWIDTH] IN BLOOD BY AUTOMATED COUNT: 26.1 % (ref 10–14.5)
GFR SERPLBLD BASED ON 1.73 SQ M-ARVRAT: > 60 ML/MIN
GLUCOSE SERPL-MCNC: 109 MG/DL (ref 70–105)
HCT VFR BLD CALC: 51 % (ref 40–54)
HGB BLD-MCNC: 16.7 G/DL (ref 13.3–17.7)
LYMPHOCYTES # BLD AUTO: 1.9 X 10^3 (ref 1–4)
LYMPHOCYTES NFR BLD AUTO: 28 % (ref 12–44)
MANUAL DIFFERENTIAL PERFORMED BLD QL: NO
MCH RBC QN AUTO: 29 PG (ref 25–34)
MCHC RBC AUTO-ENTMCNC: 33 G/DL (ref 32–36)
MCV RBC AUTO: 89 FL (ref 80–99)
MONOCYTES # BLD AUTO: 1.4 X 10^3 (ref 0–1)
MONOCYTES NFR BLD AUTO: 21 % (ref 0–12)
NEUTROPHILS # BLD AUTO: 3.3 X 10^3 (ref 1.8–7.8)
NEUTROPHILS NFR BLD AUTO: 49 % (ref 42–75)
PLATELET # BLD: 96 10^3/UL (ref 130–400)
PMV BLD AUTO: (no result) FL (ref 7.4–10.4)
POTASSIUM SERPL-SCNC: 4.7 MMOL/L (ref 3.6–5)
SODIUM SERPL-SCNC: 137 MMOL/L (ref 135–145)
WBC # BLD AUTO: 6.8 10^3/UL (ref 4.3–11)

## 2020-06-17 LAB
ALBUMIN SERPL-MCNC: 3.7 GM/DL (ref 3.2–4.5)
ALP SERPL-CCNC: 63 U/L (ref 40–136)
ALT SERPL-CCNC: 14 U/L (ref 0–55)
BASOPHILS # BLD AUTO: 0.1 10^3/UL (ref 0–0.1)
BASOPHILS NFR BLD AUTO: 1 % (ref 0–10)
BILIRUB SERPL-MCNC: 0.4 MG/DL (ref 0.1–1)
BUN/CREAT SERPL: 19
CALCIUM SERPL-MCNC: 9.1 MG/DL (ref 8.5–10.1)
CHLORIDE SERPL-SCNC: 102 MMOL/L (ref 98–107)
CO2 SERPL-SCNC: 24 MMOL/L (ref 21–32)
CREAT SERPL-MCNC: 0.72 MG/DL (ref 0.6–1.3)
EOSINOPHIL # BLD AUTO: 0.1 10^3/UL (ref 0–0.3)
EOSINOPHIL NFR BLD AUTO: 1 % (ref 0–10)
ERYTHROCYTE [DISTWIDTH] IN BLOOD BY AUTOMATED COUNT: 22.7 % (ref 10–14.5)
GFR SERPLBLD BASED ON 1.73 SQ M-ARVRAT: > 60 ML/MIN
GLUCOSE SERPL-MCNC: 89 MG/DL (ref 70–105)
HCT VFR BLD CALC: 34 % (ref 40–54)
HGB BLD-MCNC: 11.4 G/DL (ref 13.3–17.7)
LYMPHOCYTES # BLD AUTO: 2.2 X 10^3 (ref 1–4)
LYMPHOCYTES NFR BLD AUTO: 23 % (ref 12–44)
MANUAL DIFFERENTIAL PERFORMED BLD QL: NO
MCH RBC QN AUTO: 30 PG (ref 25–34)
MCHC RBC AUTO-ENTMCNC: 33 G/DL (ref 32–36)
MCV RBC AUTO: 88 FL (ref 80–99)
MONOCYTES # BLD AUTO: 2 X 10^3 (ref 0–1)
MONOCYTES NFR BLD AUTO: 21 % (ref 0–12)
NEUTROPHILS # BLD AUTO: 5.4 X 10^3 (ref 1.8–7.8)
NEUTROPHILS NFR BLD AUTO: 56 % (ref 42–75)
PLATELET # BLD: 84 10^3/UL (ref 130–400)
PMV BLD AUTO: (no result) FL (ref 7.4–10.4)
POTASSIUM SERPL-SCNC: 4.3 MMOL/L (ref 3.6–5)
PROT SERPL-MCNC: 6.7 GM/DL (ref 6.4–8.2)
SODIUM SERPL-SCNC: 133 MMOL/L (ref 135–145)
WBC # BLD AUTO: 9.7 10^3/UL (ref 4.3–11)

## 2020-06-24 ENCOUNTER — HOSPITAL ENCOUNTER (OUTPATIENT)
Dept: HOSPITAL 75 - LAB FS | Age: 75
LOS: 90 days | Discharge: HOME | End: 2020-09-22
Attending: INTERNAL MEDICINE
Payer: OTHER GOVERNMENT

## 2020-06-24 DIAGNOSIS — R91.8: ICD-10-CM

## 2020-06-24 DIAGNOSIS — C78.7: ICD-10-CM

## 2020-06-24 DIAGNOSIS — C25.2: Primary | ICD-10-CM

## 2020-06-24 LAB
BASOPHILS # BLD AUTO: 0.1 10^3/UL (ref 0–0.1)
BASOPHILS NFR BLD AUTO: 2 % (ref 0–10)
BUN/CREAT SERPL: 17
CALCIUM SERPL-MCNC: 9.3 MG/DL (ref 8.5–10.1)
CHLORIDE SERPL-SCNC: 99 MMOL/L (ref 98–107)
CO2 SERPL-SCNC: 24 MMOL/L (ref 21–32)
CREAT SERPL-MCNC: 0.69 MG/DL (ref 0.6–1.3)
EOSINOPHIL # BLD AUTO: 0 10^3/UL (ref 0–0.3)
EOSINOPHIL NFR BLD AUTO: 1 % (ref 0–10)
GFR SERPLBLD BASED ON 1.73 SQ M-ARVRAT: > 60 ML/MIN
GLUCOSE SERPL-MCNC: 103 MG/DL (ref 70–105)
HCT VFR BLD CALC: 33 % (ref 40–54)
HGB BLD-MCNC: 11 G/DL (ref 13.3–17.7)
LYMPHOCYTES # BLD AUTO: 1.6 X 10^3 (ref 1–4)
LYMPHOCYTES NFR BLD AUTO: 43 % (ref 12–44)
MANUAL DIFFERENTIAL PERFORMED BLD QL: NO
MCH RBC QN AUTO: 30 PG (ref 25–34)
MCHC RBC AUTO-ENTMCNC: 33 G/DL (ref 32–36)
MCV RBC AUTO: 88 FL (ref 80–99)
MONOCYTES # BLD AUTO: 0.5 X 10^3 (ref 0–1)
MONOCYTES NFR BLD AUTO: 14 % (ref 0–12)
NEUTROPHILS # BLD AUTO: 1.6 X 10^3 (ref 1.8–7.8)
NEUTROPHILS NFR BLD AUTO: 41 % (ref 42–75)
PLATELET # BLD: 55 10^3/UL (ref 130–400)
PMV BLD AUTO: (no result) FL (ref 7.4–10.4)
POTASSIUM SERPL-SCNC: 4.6 MMOL/L (ref 3.6–5)
SODIUM SERPL-SCNC: 135 MMOL/L (ref 135–145)
WBC # BLD AUTO: 3.8 10^3/UL (ref 4.3–11)

## 2020-06-24 PROCEDURE — 80048 BASIC METABOLIC PNL TOTAL CA: CPT

## 2020-06-24 PROCEDURE — 85025 COMPLETE CBC W/AUTO DIFF WBC: CPT

## 2020-06-24 PROCEDURE — 36415 COLL VENOUS BLD VENIPUNCTURE: CPT

## 2020-07-02 LAB
BASOPHILS # BLD AUTO: 0 10^3/UL (ref 0–0.1)
BASOPHILS NFR BLD AUTO: 0 % (ref 0–10)
BUN/CREAT SERPL: 18
CALCIUM SERPL-MCNC: 9 MG/DL (ref 8.5–10.1)
CHLORIDE SERPL-SCNC: 105 MMOL/L (ref 98–107)
CO2 SERPL-SCNC: 22 MMOL/L (ref 21–32)
CREAT SERPL-MCNC: 0.68 MG/DL (ref 0.6–1.3)
EOSINOPHIL # BLD AUTO: 0.1 10^3/UL (ref 0–0.3)
EOSINOPHIL NFR BLD AUTO: 1 % (ref 0–10)
ERYTHROCYTE [DISTWIDTH] IN BLOOD BY AUTOMATED COUNT: 22.7 % (ref 10–14.5)
GFR SERPLBLD BASED ON 1.73 SQ M-ARVRAT: > 60 ML/MIN
GLUCOSE SERPL-MCNC: 91 MG/DL (ref 70–105)
HCT VFR BLD CALC: 33 % (ref 40–54)
HGB BLD-MCNC: 11 G/DL (ref 13.3–17.7)
LYMPHOCYTES # BLD AUTO: 1.7 X 10^3 (ref 1–4)
LYMPHOCYTES NFR BLD AUTO: 25 % (ref 12–44)
MANUAL DIFFERENTIAL PERFORMED BLD QL: NO
MCH RBC QN AUTO: 30 PG (ref 25–34)
MCHC RBC AUTO-ENTMCNC: 33 G/DL (ref 32–36)
MCV RBC AUTO: 89 FL (ref 80–99)
MONOCYTES # BLD AUTO: 1.8 X 10^3 (ref 0–1)
MONOCYTES NFR BLD AUTO: 27 % (ref 0–12)
NEUTROPHILS # BLD AUTO: 3.3 X 10^3 (ref 1.8–7.8)
NEUTROPHILS NFR BLD AUTO: 48 % (ref 42–75)
PLATELET # BLD: 70 10^3/UL (ref 130–400)
PMV BLD AUTO: (no result) FL (ref 7.4–10.4)
POTASSIUM SERPL-SCNC: 4.3 MMOL/L (ref 3.6–5)
SODIUM SERPL-SCNC: 135 MMOL/L (ref 135–145)
WBC # BLD AUTO: 6.9 10^3/UL (ref 4.3–11)

## 2020-07-08 LAB
BASOPHILS # BLD AUTO: 0 10^3/UL (ref 0–0.1)
BASOPHILS NFR BLD AUTO: 1 % (ref 0–10)
BUN/CREAT SERPL: 18
CALCIUM SERPL-MCNC: 9.4 MG/DL (ref 8.5–10.1)
CHLORIDE SERPL-SCNC: 102 MMOL/L (ref 98–107)
CO2 SERPL-SCNC: 24 MMOL/L (ref 21–32)
CREAT SERPL-MCNC: 0.74 MG/DL (ref 0.6–1.3)
EOSINOPHIL # BLD AUTO: 0 10^3/UL (ref 0–0.3)
EOSINOPHIL NFR BLD AUTO: 0 % (ref 0–10)
ERYTHROCYTE [DISTWIDTH] IN BLOOD BY AUTOMATED COUNT: 21.1 % (ref 10–14.5)
GFR SERPLBLD BASED ON 1.73 SQ M-ARVRAT: > 60 ML/MIN
GLUCOSE SERPL-MCNC: 100 MG/DL (ref 70–105)
HCT VFR BLD CALC: 32 % (ref 40–54)
HGB BLD-MCNC: 10.5 G/DL (ref 13.3–17.7)
LYMPHOCYTES # BLD AUTO: 2.1 X 10^3 (ref 1–4)
LYMPHOCYTES NFR BLD AUTO: 53 % (ref 12–44)
MANUAL DIFFERENTIAL PERFORMED BLD QL: NO
MCH RBC QN AUTO: 29 PG (ref 25–34)
MCHC RBC AUTO-ENTMCNC: 33 G/DL (ref 32–36)
MCV RBC AUTO: 89 FL (ref 80–99)
MONOCYTES # BLD AUTO: 0.2 X 10^3 (ref 0–1)
MONOCYTES NFR BLD AUTO: 6 % (ref 0–12)
NEUTROPHILS # BLD AUTO: 1.6 X 10^3 (ref 1.8–7.8)
NEUTROPHILS NFR BLD AUTO: 39 % (ref 42–75)
PLATELET # BLD: 60 10^3/UL (ref 130–400)
POTASSIUM SERPL-SCNC: 4.4 MMOL/L (ref 3.6–5)
SODIUM SERPL-SCNC: 137 MMOL/L (ref 135–145)
WBC # BLD AUTO: 4 10^3/UL (ref 4.3–11)

## 2020-07-15 LAB
ALBUMIN SERPL-MCNC: 3.6 GM/DL (ref 3.2–4.5)
ALP SERPL-CCNC: 59 U/L (ref 40–136)
ALT SERPL-CCNC: 13 U/L (ref 0–55)
BASOPHILS # BLD AUTO: 0 10^3/UL (ref 0–0.1)
BASOPHILS NFR BLD AUTO: 0 % (ref 0–10)
BILIRUB SERPL-MCNC: 0.3 MG/DL (ref 0.1–1)
BUN/CREAT SERPL: 17
CALCIUM SERPL-MCNC: 9.1 MG/DL (ref 8.5–10.1)
CHLORIDE SERPL-SCNC: 104 MMOL/L (ref 98–107)
CO2 SERPL-SCNC: 21 MMOL/L (ref 21–32)
CREAT SERPL-MCNC: 0.72 MG/DL (ref 0.6–1.3)
EOSINOPHIL # BLD AUTO: 0.1 10^3/UL (ref 0–0.3)
EOSINOPHIL NFR BLD AUTO: 1 % (ref 0–10)
ERYTHROCYTE [DISTWIDTH] IN BLOOD BY AUTOMATED COUNT: 22.2 % (ref 10–14.5)
GFR SERPLBLD BASED ON 1.73 SQ M-ARVRAT: > 60 ML/MIN
GLUCOSE SERPL-MCNC: 101 MG/DL (ref 70–105)
HCT VFR BLD CALC: 32 % (ref 40–54)
HGB BLD-MCNC: 10.7 G/DL (ref 13.3–17.7)
LYMPHOCYTES # BLD AUTO: 2 X 10^3 (ref 1–4)
LYMPHOCYTES NFR BLD AUTO: 26 % (ref 12–44)
MANUAL DIFFERENTIAL PERFORMED BLD QL: NO
MCH RBC QN AUTO: 29 PG (ref 25–34)
MCHC RBC AUTO-ENTMCNC: 33 G/DL (ref 32–36)
MCV RBC AUTO: 88 FL (ref 80–99)
MONOCYTES # BLD AUTO: 1.7 X 10^3 (ref 0–1)
MONOCYTES NFR BLD AUTO: 21 % (ref 0–12)
NEUTROPHILS # BLD AUTO: 4.1 X 10^3 (ref 1.8–7.8)
NEUTROPHILS NFR BLD AUTO: 52 % (ref 42–75)
PLATELET # BLD: 52 10^3/UL (ref 130–400)
PMV BLD AUTO: (no result) FL (ref 7.4–10.4)
POTASSIUM SERPL-SCNC: 4.3 MMOL/L (ref 3.6–5)
PROT SERPL-MCNC: 6.2 GM/DL (ref 6.4–8.2)
SODIUM SERPL-SCNC: 134 MMOL/L (ref 135–145)
WBC # BLD AUTO: 7.8 10^3/UL (ref 4.3–11)

## 2020-07-16 ENCOUNTER — HOSPITAL ENCOUNTER (OUTPATIENT)
Dept: HOSPITAL 75 - RAD | Age: 75
End: 2020-07-16
Attending: INTERNAL MEDICINE
Payer: OTHER GOVERNMENT

## 2020-07-16 DIAGNOSIS — J98.4: ICD-10-CM

## 2020-07-16 DIAGNOSIS — Z95.828: ICD-10-CM

## 2020-07-16 DIAGNOSIS — C25.2: Primary | ICD-10-CM

## 2020-07-16 DIAGNOSIS — J44.9: ICD-10-CM

## 2020-07-16 PROCEDURE — 74178 CT ABD&PLV WO CNTR FLWD CNTR: CPT

## 2020-07-16 PROCEDURE — 71260 CT THORAX DX C+: CPT

## 2020-07-16 NOTE — DIAGNOSTIC IMAGING REPORT
PROCEDURE: CT chest with contrast, CT abdomen and pelvis with and

without contrast.



TECHNIQUE: Pre and post intravenous contrast axial imaging of the

abdomen and pelvis and post contrast axial imaging of the chest

were performed. Auto Exposure Controls were utilized during the

CT exam to meet ALARA standards for radiation dose reduction. 



INDICATION:  Pancreatic carcinoma and COPD. Study is performed

for follow-up.



Comparison is made with prior CT abdomen and pelvis from

02/10/2020. Comparison is also made with CT/PET study from

02/04/2020.



CT CHEST:



No axillary lymphadenopathy is detected. No definite mediastinal

or hilar lymphadenopathy is identified. No pericardial or pleural

fluid is detected. A right chest wall port has tip within the

SVC. Parenchymal evaluation again demonstrates centrilobular

emphysematous changes. Spiculated mass in the medial left upper

lobe demonstrates increase in size now measuring 15 mm compared

with 9 mm. A 2nd density slightly more inferior in the left upper

lobe also appears to be increased measuring 11 mm compared with 6

mm. Tiny nodule adjacent to the major fissure on the left in the

superior segment left lower lobe is seen measuring 5 mm. There

are 2 subpleural nodules in the right lower lobe, image 72

measuring 2 to 3 mm in size. A nodule in the lateral aspect of

the right middle lobe measures 4 mm, similar to prior. There are

subcentimeter calcified nodules as well consists of granulomas.



IMPRESSION:

1. Increase in size of spiculated densities in the left upper

lobe. There also appear to be several small nodules in both

lungs, suspicious for metastatic disease. Continued follow-up is

recommended.



CT abdomen and pelvis:



Patient has developed several low densities within the liver

consistent with hepatic metastatic disease. Lesions measure

approximately 12 mm or less. The gallbladder is unremarkable. No

biliary ductal dilatation is seen. Low-density mass tail of the

pancreas previously described is again noted, may be slightly

larger on today's exam 2.5 cm compared with 2.3 cm. No adrenal

mass is identified. Kidneys are unremarkable apart from cortical

low density suggestive of cysts. Aorta and iliac vessels are

heavily calcified. Small and large bowel loops are normal

caliber. There is no ascites. Bladder is unremarkable. No

definite abdominal or pelvic lymphadenopathy is detected. Bony

structures are unremarkable.



IMPRESSION:

1. Slight increase in size of pancreatic tail mass. The patient

has also developed hepatic metastatic disease since prior CT from

02/10/2020.



Dictated by: 



  Dictated on workstation # [id number]

## 2020-07-29 LAB
ALBUMIN SERPL-MCNC: 3.8 GM/DL (ref 3.2–4.5)
ALP SERPL-CCNC: 70 U/L (ref 40–136)
ALT SERPL-CCNC: 16 U/L (ref 0–55)
BASOPHILS # BLD AUTO: 0.1 10^3/UL (ref 0–0.1)
BASOPHILS NFR BLD AUTO: 1 % (ref 0–10)
BILIRUB SERPL-MCNC: 0.4 MG/DL (ref 0.1–1)
BUN/CREAT SERPL: 21
CALCIUM SERPL-MCNC: 9.4 MG/DL (ref 8.5–10.1)
CHLORIDE SERPL-SCNC: 101 MMOL/L (ref 98–107)
CO2 SERPL-SCNC: 22 MMOL/L (ref 21–32)
CREAT SERPL-MCNC: 0.71 MG/DL (ref 0.6–1.3)
EOSINOPHIL # BLD AUTO: 0.1 10^3/UL (ref 0–0.3)
EOSINOPHIL NFR BLD AUTO: 1 % (ref 0–10)
ERYTHROCYTE [DISTWIDTH] IN BLOOD BY AUTOMATED COUNT: 21.4 % (ref 10–14.5)
GFR SERPLBLD BASED ON 1.73 SQ M-ARVRAT: > 60 ML/MIN
GLUCOSE SERPL-MCNC: 85 MG/DL (ref 70–105)
HCT VFR BLD CALC: 35 % (ref 40–54)
HGB BLD-MCNC: 11.8 G/DL (ref 13.3–17.7)
LYMPHOCYTES # BLD AUTO: 1.9 X 10^3 (ref 1–4)
LYMPHOCYTES NFR BLD AUTO: 19 % (ref 12–44)
MANUAL DIFFERENTIAL PERFORMED BLD QL: NO
MCH RBC QN AUTO: 30 PG (ref 25–34)
MCHC RBC AUTO-ENTMCNC: 34 G/DL (ref 32–36)
MCV RBC AUTO: 87 FL (ref 80–99)
MONOCYTES # BLD AUTO: 1.8 X 10^3 (ref 0–1)
MONOCYTES NFR BLD AUTO: 18 % (ref 0–12)
NEUTROPHILS # BLD AUTO: 6.3 X 10^3 (ref 1.8–7.8)
NEUTROPHILS NFR BLD AUTO: 62 % (ref 42–75)
PLATELET # BLD: 93 10^3/UL (ref 130–400)
PMV BLD AUTO: (no result) FL (ref 7.4–10.4)
POTASSIUM SERPL-SCNC: 4.3 MMOL/L (ref 3.6–5)
PROT SERPL-MCNC: 6.6 GM/DL (ref 6.4–8.2)
SODIUM SERPL-SCNC: 132 MMOL/L (ref 135–145)
WBC # BLD AUTO: 10.1 10^3/UL (ref 4.3–11)

## 2020-08-19 ENCOUNTER — HOSPITAL ENCOUNTER (OUTPATIENT)
Dept: HOSPITAL 75 - ONC | Age: 75
LOS: 13 days | Discharge: HOME | End: 2020-09-01
Attending: INTERNAL MEDICINE
Payer: OTHER GOVERNMENT

## 2020-08-19 VITALS — WEIGHT: 120 LBS | HEIGHT: 68 IN | BODY MASS INDEX: 18.19 KG/M2

## 2020-08-19 DIAGNOSIS — K22.70: ICD-10-CM

## 2020-08-19 DIAGNOSIS — D69.6: ICD-10-CM

## 2020-08-19 DIAGNOSIS — Z72.0: ICD-10-CM

## 2020-08-19 DIAGNOSIS — C25.2: Primary | ICD-10-CM

## 2020-08-19 DIAGNOSIS — R92.8: ICD-10-CM

## 2020-08-19 DIAGNOSIS — D64.9: ICD-10-CM

## 2020-08-19 PROCEDURE — 99213 OFFICE O/P EST LOW 20 MIN: CPT

## 2020-08-19 PROCEDURE — 36415 COLL VENOUS BLD VENIPUNCTURE: CPT

## 2020-08-19 PROCEDURE — 86301 IMMUNOASSAY TUMOR CA 19-9: CPT

## 2020-08-19 PROCEDURE — 96413 CHEMO IV INFUSION 1 HR: CPT

## 2020-08-19 PROCEDURE — 80048 BASIC METABOLIC PNL TOTAL CA: CPT

## 2020-08-19 PROCEDURE — 80053 COMPREHEN METABOLIC PANEL: CPT

## 2020-08-19 PROCEDURE — 96375 TX/PRO/DX INJ NEW DRUG ADDON: CPT

## 2020-08-19 PROCEDURE — 36591 DRAW BLOOD OFF VENOUS DEVICE: CPT

## 2020-08-19 PROCEDURE — 84443 ASSAY THYROID STIM HORMONE: CPT

## 2020-08-19 PROCEDURE — 96417 CHEMO IV INFUS EACH ADDL SEQ: CPT

## 2020-08-19 PROCEDURE — 85025 COMPLETE CBC W/AUTO DIFF WBC: CPT

## 2020-08-20 LAB
ALBUMIN SERPL-MCNC: 3.6 GM/DL (ref 3.2–4.5)
ALP SERPL-CCNC: 51 U/L (ref 40–136)
ALT SERPL-CCNC: 14 U/L (ref 0–55)
BASOPHILS # BLD AUTO: 0.1 10^3/UL (ref 0–0.1)
BASOPHILS NFR BLD AUTO: 1 % (ref 0–10)
BILIRUB SERPL-MCNC: 0.4 MG/DL (ref 0.1–1)
BLD SMEAR INTERP: YES
BUN/CREAT SERPL: 19
CALCIUM SERPL-MCNC: 8.9 MG/DL (ref 8.5–10.1)
CHLORIDE SERPL-SCNC: 103 MMOL/L (ref 98–107)
CO2 SERPL-SCNC: 24 MMOL/L (ref 21–32)
CREAT SERPL-MCNC: 0.68 MG/DL (ref 0.6–1.3)
EOSINOPHIL # BLD AUTO: 0.1 10^3/UL (ref 0–0.3)
EOSINOPHIL NFR BLD AUTO: 1 % (ref 0–10)
ERYTHROCYTE [DISTWIDTH] IN BLOOD BY AUTOMATED COUNT: 20.8 % (ref 10–14.5)
GFR SERPLBLD BASED ON 1.73 SQ M-ARVRAT: > 60 ML/MIN
GLUCOSE SERPL-MCNC: 88 MG/DL (ref 70–105)
HCT VFR BLD CALC: 32 % (ref 40–54)
HGB BLD-MCNC: 10.8 G/DL (ref 13.3–17.7)
LYMPHOCYTES # BLD AUTO: 2.2 X 10^3 (ref 1–4)
LYMPHOCYTES NFR BLD AUTO: 26 % (ref 12–44)
MANUAL DIFFERENTIAL PERFORMED BLD QL: NO
MCH RBC QN AUTO: 29 PG (ref 25–34)
MCHC RBC AUTO-ENTMCNC: 34 G/DL (ref 32–36)
MCV RBC AUTO: 86 FL (ref 80–99)
MONOCYTES # BLD AUTO: 1.7 X 10^3 (ref 0–1)
MONOCYTES NFR BLD AUTO: 21 % (ref 0–12)
NEUTROPHILS # BLD AUTO: 4.4 X 10^3 (ref 1.8–7.8)
NEUTROPHILS NFR BLD AUTO: 52 % (ref 42–75)
PLATELET # BLD: 69 10^3/UL (ref 130–400)
POTASSIUM SERPL-SCNC: 4.4 MMOL/L (ref 3.6–5)
PROT SERPL-MCNC: 6.4 GM/DL (ref 6.4–8.2)
SODIUM SERPL-SCNC: 133 MMOL/L (ref 135–145)
WBC # BLD AUTO: 8.5 10^3/UL (ref 4.3–11)

## 2020-08-21 LAB — PMV BLD AUTO: (no result) FL (ref 7.4–10.4)

## 2020-09-10 LAB
ALBUMIN SERPL-MCNC: 3.8 GM/DL (ref 3.2–4.5)
ALP SERPL-CCNC: 62 U/L (ref 40–136)
ALT SERPL-CCNC: 13 U/L (ref 0–55)
BASOPHILS # BLD AUTO: 0.1 10^3/UL (ref 0–0.1)
BASOPHILS NFR BLD AUTO: 1 % (ref 0–10)
BILIRUB SERPL-MCNC: 0.4 MG/DL (ref 0.1–1)
BUN/CREAT SERPL: 17
CALCIUM SERPL-MCNC: 8.9 MG/DL (ref 8.5–10.1)
CHLORIDE SERPL-SCNC: 105 MMOL/L (ref 98–107)
CO2 SERPL-SCNC: 26 MMOL/L (ref 21–32)
CREAT SERPL-MCNC: 0.75 MG/DL (ref 0.6–1.3)
EOSINOPHIL # BLD AUTO: 0.2 10^3/UL (ref 0–0.3)
EOSINOPHIL NFR BLD AUTO: 2 % (ref 0–10)
GFR SERPLBLD BASED ON 1.73 SQ M-ARVRAT: > 60 ML/MIN
GLUCOSE SERPL-MCNC: 128 MG/DL (ref 70–105)
HCT VFR BLD CALC: 35 % (ref 40–54)
HGB BLD-MCNC: 11.9 G/DL (ref 13.3–17.7)
LYMPHOCYTES # BLD AUTO: 2.1 X 10^3 (ref 1–4)
LYMPHOCYTES NFR BLD AUTO: 21 % (ref 12–44)
MANUAL DIFFERENTIAL PERFORMED BLD QL: NO
MCH RBC QN AUTO: 29 PG (ref 25–34)
MCHC RBC AUTO-ENTMCNC: 34 G/DL (ref 32–36)
MCV RBC AUTO: 86 FL (ref 80–99)
MONOCYTES # BLD AUTO: 2 X 10^3 (ref 0–1)
MONOCYTES NFR BLD AUTO: 20 % (ref 0–12)
NEUTROPHILS # BLD AUTO: 5.5 X 10^3 (ref 1.8–7.8)
NEUTROPHILS NFR BLD AUTO: 56 % (ref 42–75)
PLATELET # BLD: 43 10^3/UL (ref 130–400)
PMV BLD AUTO: (no result) FL (ref 7.4–10.4)
POTASSIUM SERPL-SCNC: 4 MMOL/L (ref 3.6–5)
PROT SERPL-MCNC: 6.7 GM/DL (ref 6.4–8.2)
SODIUM SERPL-SCNC: 137 MMOL/L (ref 135–145)
WBC # BLD AUTO: 9.7 10^3/UL (ref 4.3–11)

## 2020-09-30 LAB
ALBUMIN SERPL-MCNC: 3.7 GM/DL (ref 3.2–4.5)
ALP SERPL-CCNC: 63 U/L (ref 40–136)
ALT SERPL-CCNC: 14 U/L (ref 0–55)
BASOPHILS # BLD AUTO: 0.1 10^3/UL (ref 0–0.1)
BASOPHILS NFR BLD AUTO: 1 % (ref 0–10)
BILIRUB SERPL-MCNC: 0.4 MG/DL (ref 0.1–1)
BUN/CREAT SERPL: 16
CALCIUM SERPL-MCNC: 8.6 MG/DL (ref 8.5–10.1)
CHLORIDE SERPL-SCNC: 105 MMOL/L (ref 98–107)
CO2 SERPL-SCNC: 20 MMOL/L (ref 21–32)
CREAT SERPL-MCNC: 0.79 MG/DL (ref 0.6–1.3)
EOSINOPHIL # BLD AUTO: 0.2 10^3/UL (ref 0–0.3)
EOSINOPHIL NFR BLD AUTO: 2 % (ref 0–10)
GFR SERPLBLD BASED ON 1.73 SQ M-ARVRAT: > 60 ML/MIN
GLUCOSE SERPL-MCNC: 89 MG/DL (ref 70–105)
HCT VFR BLD CALC: 35 % (ref 40–54)
HGB BLD-MCNC: 11.4 G/DL (ref 13.3–17.7)
LYMPHOCYTES # BLD AUTO: 2.7 10^3/UL (ref 1–4)
LYMPHOCYTES NFR BLD AUTO: 23 % (ref 12–44)
MANUAL DIFFERENTIAL PERFORMED BLD QL: NO
MCH RBC QN AUTO: 28 PG (ref 25–34)
MCHC RBC AUTO-ENTMCNC: 33 G/DL (ref 32–36)
MCV RBC AUTO: 86 FL (ref 80–99)
MONOCYTES # BLD AUTO: 2.1 10^3/UL (ref 0–1)
MONOCYTES NFR BLD AUTO: 18 % (ref 0–12)
NEUTROPHILS # BLD AUTO: 6.4 10^3/UL (ref 1.8–7.8)
NEUTROPHILS NFR BLD AUTO: 55 % (ref 42–75)
PLATELET # BLD: 60 10^3/UL (ref 130–400)
PMV BLD AUTO: (no result) FL (ref 9–12.2)
POTASSIUM SERPL-SCNC: 4.4 MMOL/L (ref 3.6–5)
PROT SERPL-MCNC: 6.6 GM/DL (ref 6.4–8.2)
SODIUM SERPL-SCNC: 136 MMOL/L (ref 135–145)
WBC # BLD AUTO: 11.7 10^3/UL (ref 4.3–11)

## 2020-10-19 ENCOUNTER — HOSPITAL ENCOUNTER (OUTPATIENT)
Dept: HOSPITAL 75 - RAD | Age: 75
End: 2020-10-19
Attending: INTERNAL MEDICINE
Payer: OTHER GOVERNMENT

## 2020-10-19 DIAGNOSIS — R91.1: ICD-10-CM

## 2020-10-19 DIAGNOSIS — C78.7: ICD-10-CM

## 2020-10-19 DIAGNOSIS — C25.2: Primary | ICD-10-CM

## 2020-10-19 LAB
ALBUMIN SERPL-MCNC: 3.4 GM/DL (ref 3.2–4.5)
ALP SERPL-CCNC: 58 U/L (ref 40–136)
ALT SERPL-CCNC: 18 U/L (ref 0–55)
BASOPHILS # BLD AUTO: 0.2 10^3/UL (ref 0–0.1)
BASOPHILS NFR BLD AUTO: 1 % (ref 0–10)
BILIRUB SERPL-MCNC: 0.4 MG/DL (ref 0.1–1)
BUN/CREAT SERPL: 16
CALCIUM SERPL-MCNC: 8.8 MG/DL (ref 8.5–10.1)
CHLORIDE SERPL-SCNC: 105 MMOL/L (ref 98–107)
CO2 SERPL-SCNC: 21 MMOL/L (ref 21–32)
CREAT SERPL-MCNC: 0.73 MG/DL (ref 0.6–1.3)
EOSINOPHIL # BLD AUTO: 0.1 10^3/UL (ref 0–0.3)
EOSINOPHIL NFR BLD AUTO: 1 % (ref 0–10)
GFR SERPLBLD BASED ON 1.73 SQ M-ARVRAT: > 60 ML/MIN
GLUCOSE SERPL-MCNC: 97 MG/DL (ref 70–105)
HCT VFR BLD CALC: 35 % (ref 40–54)
HGB BLD-MCNC: 11.4 G/DL (ref 13.3–17.7)
LYMPHOCYTES # BLD AUTO: 2.3 10^3/UL (ref 1–4)
LYMPHOCYTES NFR BLD AUTO: 15 % (ref 12–44)
MANUAL DIFFERENTIAL PERFORMED BLD QL: NO
MCH RBC QN AUTO: 28 PG (ref 25–34)
MCHC RBC AUTO-ENTMCNC: 33 G/DL (ref 32–36)
MCV RBC AUTO: 84 FL (ref 80–99)
MONOCYTES # BLD AUTO: 2.2 10^3/UL (ref 0–1)
MONOCYTES NFR BLD AUTO: 15 % (ref 0–12)
NEUTROPHILS # BLD AUTO: 9.8 10^3/UL (ref 1.8–7.8)
NEUTROPHILS NFR BLD AUTO: 65 % (ref 42–75)
PLATELET # BLD: 123 10^3/UL (ref 130–400)
PMV BLD AUTO: (no result) FL (ref 9–12.2)
POTASSIUM SERPL-SCNC: 4.3 MMOL/L (ref 3.6–5)
PROT SERPL-MCNC: 6.8 GM/DL (ref 6.4–8.2)
SODIUM SERPL-SCNC: 137 MMOL/L (ref 135–145)
WBC # BLD AUTO: 15.1 10^3/UL (ref 4.3–11)

## 2020-10-19 PROCEDURE — 74178 CT ABD&PLV WO CNTR FLWD CNTR: CPT

## 2020-10-19 PROCEDURE — 71260 CT THORAX DX C+: CPT

## 2020-10-19 NOTE — DIAGNOSTIC IMAGING REPORT
EXAMINATION: CT Chest with intravenous contrast, CT Abdomen and

Pelvis without and with intravenous contrast.



TECHNIQUE: Pre and post intravenous contrast axial imaging of the

abdomen and pelvis and post contrast axial imaging of the chest

were performed. All CT scans use one or more of the following

dose optimizing techniques: automated exposure control, MA and/or

KvP adjustment based on a patient size and exam type, or

iterative reconstruction. 



HISTORY: Pancreatic cancer.



COMPARISON: 07/16/2020



FINDINGS: 



There are new left lower lobe peribronchial vascular and

tree-in-bud nodules suggestive of aspiration. A few other small

pulmonary nodules in the right lung are stable. No pleural

effusion. No pneumothorax. Left upper lobe nodules decreased in

size measuring 9 x 9 mm, previously 14 x 13 mm.



There is no axillary or supraclavicular lymphadenopathy. There is

no mediastinal lymphadenopathy. Heart size is normal. There are

mild coronary artery calcifications.  No pericardial effusion.

Aorta is normal in caliber.



Previously seen liver lesions have mostly resolved. A segment

seven lesion measures approximately 4 mm, previously 12 mm. The

other lesions are not identifiable. There is no biliary ductal

dilation. Gallbladder is normal.  Pancreatic tail mass has

decreased in size and is no longer identifiable as a discrete

mass. Spleen is normal. Adrenal glands are normal.



Simple cysts are seen in the kidneys. No suspicious renal

lesions. There is no hydronephrosis. Urinary bladder is normal.



Visualized bowel is normal in caliber without obstruction or

inflammation. No free fluid or air. No abdominal or pelvic

lymphadenopathy. Abdominal aorta is heavily atherosclerotic

without aneurysm.



There are no suspicious osseous lesions.



IMPRESSION:



1. Significant decrease in size lung nodule in the left apex,

liver metastases and pancreatic tail mass.



Dictated by: 



  Dictated on workstation # [id number]

## 2020-11-10 LAB
ALBUMIN SERPL-MCNC: 3.8 GM/DL (ref 3.2–4.5)
ALP SERPL-CCNC: 61 U/L (ref 40–136)
ALT SERPL-CCNC: 16 U/L (ref 0–55)
BASOPHILS # BLD AUTO: 0.1 10^3/UL (ref 0–0.1)
BASOPHILS NFR BLD AUTO: 1 % (ref 0–10)
BILIRUB SERPL-MCNC: 0.5 MG/DL (ref 0.1–1)
BLD SMEAR INTERP: YES
BUN/CREAT SERPL: 19
CALCIUM SERPL-MCNC: 8.9 MG/DL (ref 8.5–10.1)
CHLORIDE SERPL-SCNC: 103 MMOL/L (ref 98–107)
CO2 SERPL-SCNC: 22 MMOL/L (ref 21–32)
CREAT SERPL-MCNC: 0.9 MG/DL (ref 0.6–1.3)
EOSINOPHIL # BLD AUTO: 0.1 10^3/UL (ref 0–0.3)
EOSINOPHIL NFR BLD AUTO: 1 % (ref 0–10)
GFR SERPLBLD BASED ON 1.73 SQ M-ARVRAT: > 60 ML/MIN
GLUCOSE SERPL-MCNC: 87 MG/DL (ref 70–105)
HCT VFR BLD CALC: 34 % (ref 40–54)
HGB BLD-MCNC: 11 G/DL (ref 13.3–17.7)
LYMPHOCYTES # BLD AUTO: 2.6 10^3/UL (ref 1–4)
LYMPHOCYTES NFR BLD AUTO: 25 % (ref 12–44)
MANUAL DIFFERENTIAL PERFORMED BLD QL: NO
MCH RBC QN AUTO: 27 PG (ref 25–34)
MCHC RBC AUTO-ENTMCNC: 32 G/DL (ref 32–36)
MCV RBC AUTO: 85 FL (ref 80–99)
MONOCYTES # BLD AUTO: 1.4 10^3/UL (ref 0–1)
MONOCYTES NFR BLD AUTO: 14 % (ref 0–12)
NEUTROPHILS # BLD AUTO: 5.8 10^3/UL (ref 1.8–7.8)
NEUTROPHILS NFR BLD AUTO: 57 % (ref 42–75)
PLATELET # BLD: 89 10^3/UL (ref 130–400)
POTASSIUM SERPL-SCNC: 4.4 MMOL/L (ref 3.6–5)
PROT SERPL-MCNC: 6.9 GM/DL (ref 6.4–8.2)
SODIUM SERPL-SCNC: 134 MMOL/L (ref 135–145)
WBC # BLD AUTO: 10.2 10^3/UL (ref 4.3–11)

## 2020-12-02 ENCOUNTER — HOSPITAL ENCOUNTER (OUTPATIENT)
Dept: HOSPITAL 75 - ONC | Age: 75
LOS: 7 days | Discharge: HOME | End: 2020-12-09
Attending: INTERNAL MEDICINE
Payer: OTHER GOVERNMENT

## 2020-12-02 DIAGNOSIS — D69.6: ICD-10-CM

## 2020-12-02 DIAGNOSIS — Z90.81: ICD-10-CM

## 2020-12-02 DIAGNOSIS — C78.7: ICD-10-CM

## 2020-12-02 DIAGNOSIS — Z51.11: Primary | ICD-10-CM

## 2020-12-02 DIAGNOSIS — R91.8: ICD-10-CM

## 2020-12-02 DIAGNOSIS — C25.2: ICD-10-CM

## 2020-12-02 DIAGNOSIS — Z90.410: ICD-10-CM

## 2020-12-02 DIAGNOSIS — D61.818: ICD-10-CM

## 2020-12-02 LAB
ALBUMIN SERPL-MCNC: 3.7 GM/DL (ref 3.2–4.5)
ALP SERPL-CCNC: 60 U/L (ref 40–136)
ALT SERPL-CCNC: 15 U/L (ref 0–55)
BASOPHILS # BLD AUTO: 0.1 10^3/UL (ref 0–0.1)
BASOPHILS NFR BLD AUTO: 1 % (ref 0–10)
BILIRUB SERPL-MCNC: 0.4 MG/DL (ref 0.1–1)
BUN/CREAT SERPL: 17
CALCIUM SERPL-MCNC: 8.7 MG/DL (ref 8.5–10.1)
CHLORIDE SERPL-SCNC: 107 MMOL/L (ref 98–107)
CO2 SERPL-SCNC: 21 MMOL/L (ref 21–32)
CREAT SERPL-MCNC: 0.77 MG/DL (ref 0.6–1.3)
EOSINOPHIL # BLD AUTO: 0.1 10^3/UL (ref 0–0.3)
EOSINOPHIL NFR BLD AUTO: 1 % (ref 0–10)
GFR SERPLBLD BASED ON 1.73 SQ M-ARVRAT: > 60 ML/MIN
GLUCOSE SERPL-MCNC: 97 MG/DL (ref 70–105)
HCT VFR BLD CALC: 34 % (ref 40–54)
HGB BLD-MCNC: 10.9 G/DL (ref 13.3–17.7)
LYMPHOCYTES # BLD AUTO: 2.4 10^3/UL (ref 1–4)
LYMPHOCYTES NFR BLD AUTO: 21 % (ref 12–44)
MANUAL DIFFERENTIAL PERFORMED BLD QL: NO
MCH RBC QN AUTO: 27 PG (ref 25–34)
MCHC RBC AUTO-ENTMCNC: 32 G/DL (ref 32–36)
MCV RBC AUTO: 86 FL (ref 80–99)
MONOCYTES # BLD AUTO: 2.1 10^3/UL (ref 0–1)
MONOCYTES NFR BLD AUTO: 18 % (ref 0–12)
NEUTROPHILS # BLD AUTO: 6.5 10^3/UL (ref 1.8–7.8)
NEUTROPHILS NFR BLD AUTO: 57 % (ref 42–75)
PLATELET # BLD: 81 10^3/UL (ref 130–400)
POTASSIUM SERPL-SCNC: 4.2 MMOL/L (ref 3.6–5)
PROT SERPL-MCNC: 6.7 GM/DL (ref 6.4–8.2)
SODIUM SERPL-SCNC: 137 MMOL/L (ref 135–145)
WBC # BLD AUTO: 11.6 10^3/UL (ref 4.3–11)

## 2020-12-02 PROCEDURE — 96413 CHEMO IV INFUSION 1 HR: CPT

## 2020-12-02 PROCEDURE — 85025 COMPLETE CBC W/AUTO DIFF WBC: CPT

## 2020-12-02 PROCEDURE — 36591 DRAW BLOOD OFF VENOUS DEVICE: CPT

## 2020-12-02 PROCEDURE — 86301 IMMUNOASSAY TUMOR CA 19-9: CPT

## 2020-12-02 PROCEDURE — 80053 COMPREHEN METABOLIC PANEL: CPT

## 2020-12-02 PROCEDURE — 84443 ASSAY THYROID STIM HORMONE: CPT

## 2020-12-23 ENCOUNTER — HOSPITAL ENCOUNTER (OUTPATIENT)
Dept: HOSPITAL 75 - ONC | Age: 75
LOS: 15 days | Discharge: HOME | End: 2021-01-07
Attending: INTERNAL MEDICINE
Payer: OTHER GOVERNMENT

## 2020-12-23 DIAGNOSIS — Z90.410: ICD-10-CM

## 2020-12-23 DIAGNOSIS — D69.6: ICD-10-CM

## 2020-12-23 DIAGNOSIS — C25.2: ICD-10-CM

## 2020-12-23 DIAGNOSIS — C78.7: ICD-10-CM

## 2020-12-23 DIAGNOSIS — R91.8: ICD-10-CM

## 2020-12-23 DIAGNOSIS — Z90.81: ICD-10-CM

## 2020-12-23 DIAGNOSIS — D61.818: ICD-10-CM

## 2020-12-23 DIAGNOSIS — Z51.11: Primary | ICD-10-CM

## 2020-12-23 LAB
ALBUMIN SERPL-MCNC: 3.9 GM/DL (ref 3.2–4.5)
ALP SERPL-CCNC: 68 U/L (ref 40–136)
ALT SERPL-CCNC: 15 U/L (ref 0–55)
BASOPHILS # BLD AUTO: 0.1 10^3/UL (ref 0–0.1)
BASOPHILS NFR BLD AUTO: 1 % (ref 0–10)
BILIRUB SERPL-MCNC: 0.5 MG/DL (ref 0.1–1)
BUN/CREAT SERPL: 16
CALCIUM SERPL-MCNC: 9 MG/DL (ref 8.5–10.1)
CHLORIDE SERPL-SCNC: 105 MMOL/L (ref 98–107)
CO2 SERPL-SCNC: 22 MMOL/L (ref 21–32)
CREAT SERPL-MCNC: 0.76 MG/DL (ref 0.6–1.3)
EOSINOPHIL # BLD AUTO: 0.1 10^3/UL (ref 0–0.3)
EOSINOPHIL NFR BLD AUTO: 1 % (ref 0–10)
GFR SERPLBLD BASED ON 1.73 SQ M-ARVRAT: > 60 ML/MIN
GLUCOSE SERPL-MCNC: 89 MG/DL (ref 70–105)
HCT VFR BLD CALC: 35 % (ref 40–54)
HGB BLD-MCNC: 11.3 G/DL (ref 13.3–17.7)
LYMPHOCYTES # BLD AUTO: 2.3 10^3/UL (ref 1–4)
LYMPHOCYTES NFR BLD AUTO: 19 % (ref 12–44)
MANUAL DIFFERENTIAL PERFORMED BLD QL: NO
MCH RBC QN AUTO: 28 PG (ref 25–34)
MCHC RBC AUTO-ENTMCNC: 33 G/DL (ref 32–36)
MCV RBC AUTO: 84 FL (ref 80–99)
MONOCYTES # BLD AUTO: 1.8 10^3/UL (ref 0–1)
MONOCYTES NFR BLD AUTO: 16 % (ref 0–12)
NEUTROPHILS # BLD AUTO: 7.1 10^3/UL (ref 1.8–7.8)
NEUTROPHILS NFR BLD AUTO: 61 % (ref 42–75)
PLATELET # BLD: 72 10^3/UL (ref 130–400)
PMV BLD AUTO: (no result) FL (ref 9–12.2)
POTASSIUM SERPL-SCNC: 4.3 MMOL/L (ref 3.6–5)
PROT SERPL-MCNC: 6.8 GM/DL (ref 6.4–8.2)
SODIUM SERPL-SCNC: 136 MMOL/L (ref 135–145)
WBC # BLD AUTO: 11.7 10^3/UL (ref 4.3–11)

## 2020-12-23 PROCEDURE — 84443 ASSAY THYROID STIM HORMONE: CPT

## 2020-12-23 PROCEDURE — 86301 IMMUNOASSAY TUMOR CA 19-9: CPT

## 2020-12-23 PROCEDURE — 36591 DRAW BLOOD OFF VENOUS DEVICE: CPT

## 2020-12-23 PROCEDURE — 96413 CHEMO IV INFUSION 1 HR: CPT

## 2020-12-23 PROCEDURE — 85025 COMPLETE CBC W/AUTO DIFF WBC: CPT

## 2020-12-23 PROCEDURE — 80053 COMPREHEN METABOLIC PANEL: CPT

## 2021-01-13 LAB
ALBUMIN SERPL-MCNC: 3.9 GM/DL (ref 3.2–4.5)
ALP SERPL-CCNC: 65 U/L (ref 40–136)
ALT SERPL-CCNC: 17 U/L (ref 0–55)
BASOPHILS # BLD AUTO: 0.1 10^3/UL (ref 0–0.1)
BASOPHILS NFR BLD AUTO: 1 % (ref 0–10)
BILIRUB SERPL-MCNC: 0.4 MG/DL (ref 0.1–1)
BUN/CREAT SERPL: 16
CALCIUM SERPL-MCNC: 9.1 MG/DL (ref 8.5–10.1)
CHLORIDE SERPL-SCNC: 105 MMOL/L (ref 98–107)
CO2 SERPL-SCNC: 24 MMOL/L (ref 21–32)
CREAT SERPL-MCNC: 0.8 MG/DL (ref 0.6–1.3)
EOSINOPHIL # BLD AUTO: 0.2 10^3/UL (ref 0–0.3)
EOSINOPHIL NFR BLD AUTO: 2 % (ref 0–10)
GFR SERPLBLD BASED ON 1.73 SQ M-ARVRAT: > 60 ML/MIN
GLUCOSE SERPL-MCNC: 90 MG/DL (ref 70–105)
HCT VFR BLD CALC: 37 % (ref 40–54)
HGB BLD-MCNC: 11.8 G/DL (ref 13.3–17.7)
LYMPHOCYTES # BLD AUTO: 2.5 10^3/UL (ref 1–4)
LYMPHOCYTES NFR BLD AUTO: 25 % (ref 12–44)
MANUAL DIFFERENTIAL PERFORMED BLD QL: NO
MCH RBC QN AUTO: 27 PG (ref 25–34)
MCHC RBC AUTO-ENTMCNC: 32 G/DL (ref 32–36)
MCV RBC AUTO: 86 FL (ref 80–99)
MONOCYTES # BLD AUTO: 1.5 10^3/UL (ref 0–1)
MONOCYTES NFR BLD AUTO: 15 % (ref 0–12)
NEUTROPHILS # BLD AUTO: 5.6 10^3/UL (ref 1.8–7.8)
NEUTROPHILS NFR BLD AUTO: 55 % (ref 42–75)
PLATELET # BLD: 54 10^3/UL (ref 130–400)
PMV BLD AUTO: (no result) FL (ref 9–12.2)
POTASSIUM SERPL-SCNC: 4.3 MMOL/L (ref 3.6–5)
PROT SERPL-MCNC: 6.7 GM/DL (ref 6.4–8.2)
SODIUM SERPL-SCNC: 137 MMOL/L (ref 135–145)
WBC # BLD AUTO: 10.1 10^3/UL (ref 4.3–11)

## 2021-01-28 ENCOUNTER — HOSPITAL ENCOUNTER (OUTPATIENT)
Dept: HOSPITAL 75 - RAD | Age: 76
End: 2021-01-28
Attending: NURSE PRACTITIONER
Payer: OTHER GOVERNMENT

## 2021-01-28 DIAGNOSIS — C25.2: Primary | ICD-10-CM

## 2021-01-28 DIAGNOSIS — R91.1: ICD-10-CM

## 2021-01-28 PROCEDURE — 71260 CT THORAX DX C+: CPT

## 2021-01-28 PROCEDURE — 74178 CT ABD&PLV WO CNTR FLWD CNTR: CPT

## 2021-01-28 NOTE — DIAGNOSTIC IMAGING REPORT
PROCEDURE: CT chest with contrast, CT abdomen and pelvis with and

without contrast.



TECHNIQUE: Pre and post intravenous contrast axial imaging of the

abdomen and pelvis and post contrast axial imaging of the chest

were performed. Auto Exposure Controls were utilized during the

CT exam to meet ALARA standards for radiation dose reduction. 



INDICATION:  Pancreatic carcinoma, followup.



Correlation is made to prior CT from 10/19/2020.



CT CHEST:



A right chest wall port has tip in the SVC. No axillary

lymphadenopathy is detected. No mediastinal or hilar

lymphadenopathy is detected. No pericardial or pleural fluid is

identified. Parenchymal evaluation does show centrilobular

emphysematous changes. Slight irregular density in the medial

left upper lobe, image 25 is 6 mm compare with 9 mm on prior.

Ill-defined opacity and more inferiorly in the left upper lobe,

image 43 is stable at 6 mm. Subpleural nodules in the right lower

lobe, image 73 appear stable. There is a tiny nodule in the

inferior left upper lobe, image 76 which is stable. The

tree-in-bud infiltrates left lower lobe previously noted have

improved but do partially remain. No new infiltrates are seen.



IMPRESSION:

1. No evidence of thoracic lymphadenopathy.

2. There has been some decrease in size of pulmonary nodule since

exam from 10/19/2020. Left basilar infiltrate has improved as

well. No new abnormality is detected.



CT ABDOMEN AND PELVIS:



Vague peripheral based low densities right lobe of the liver

appear stable. No discrete liver mass is detected. Gallbladder is

unremarkable. There is no biliary ductal dilatation. Pancreas

appears stable. No discrete pancreatic mass is identified on

today's study. The spleen is unremarkable. No adrenal mass is

detected. Kidneys are unremarkable. Aorta is calcified but not

aneurysmal. No central retroperitoneal or mesenteric

lymphadenopathy is seen. There is a large amount of stool

throughout the colon. Bowel loops are normal caliber. There is no

obstruction. No free fluid or fluid collection is seen. Bladder

and prostate are unremarkable. The bony structures are

unremarkable.



IMPRESSION: Stable CT abdomen and pelvis since exam from

10/19/2020. No definite abdominal or pelvic lymphadenopathy is

seen. No discrete mass is identified. The liver appears stable.



Dictated by: 



  Dictated on workstation # [id number]

## 2021-02-03 LAB
ALBUMIN SERPL-MCNC: 3.3 GM/DL (ref 3.2–4.5)
ALP SERPL-CCNC: 58 U/L (ref 40–136)
ALT SERPL-CCNC: 11 U/L (ref 0–55)
BASOPHILS # BLD AUTO: 0.1 10^3/UL (ref 0–0.1)
BASOPHILS NFR BLD AUTO: 1 % (ref 0–10)
BILIRUB SERPL-MCNC: 0.3 MG/DL (ref 0.1–1)
BUN/CREAT SERPL: 18
CALCIUM SERPL-MCNC: 8.1 MG/DL (ref 8.5–10.1)
CHLORIDE SERPL-SCNC: 110 MMOL/L (ref 98–107)
CO2 SERPL-SCNC: 20 MMOL/L (ref 21–32)
CREAT SERPL-MCNC: 0.72 MG/DL (ref 0.6–1.3)
EOSINOPHIL # BLD AUTO: 0.1 10^3/UL (ref 0–0.3)
EOSINOPHIL NFR BLD AUTO: 1 % (ref 0–10)
GFR SERPLBLD BASED ON 1.73 SQ M-ARVRAT: > 60 ML/MIN
GLUCOSE SERPL-MCNC: 86 MG/DL (ref 70–105)
HCT VFR BLD CALC: 36 % (ref 40–54)
HGB BLD-MCNC: 11.9 G/DL (ref 13.3–17.7)
LYMPHOCYTES # BLD AUTO: 2.3 10^3/UL (ref 1–4)
LYMPHOCYTES NFR BLD AUTO: 19 % (ref 12–44)
MANUAL DIFFERENTIAL PERFORMED BLD QL: NO
MCH RBC QN AUTO: 28 PG (ref 25–34)
MCHC RBC AUTO-ENTMCNC: 33 G/DL (ref 32–36)
MCV RBC AUTO: 84 FL (ref 80–99)
MONOCYTES # BLD AUTO: 1.9 10^3/UL (ref 0–1)
MONOCYTES NFR BLD AUTO: 16 % (ref 0–12)
NEUTROPHILS # BLD AUTO: 7.2 10^3/UL (ref 1.8–7.8)
NEUTROPHILS NFR BLD AUTO: 61 % (ref 42–75)
PLATELET # BLD: 75 10^3/UL (ref 130–400)
POTASSIUM SERPL-SCNC: 3.8 MMOL/L (ref 3.6–5)
PROT SERPL-MCNC: 6 GM/DL (ref 6.4–8.2)
SODIUM SERPL-SCNC: 138 MMOL/L (ref 135–145)
WBC # BLD AUTO: 11.9 10^3/UL (ref 4.3–11)

## 2021-03-17 LAB
ALBUMIN SERPL-MCNC: 3.8 GM/DL (ref 3.2–4.5)
ALP SERPL-CCNC: 62 U/L (ref 40–136)
ALT SERPL-CCNC: 15 U/L (ref 0–55)
BASOPHILS # BLD AUTO: 0.1 10^3/UL (ref 0–0.1)
BASOPHILS NFR BLD AUTO: 1 % (ref 0–10)
BILIRUB SERPL-MCNC: 0.5 MG/DL (ref 0.1–1)
BUN/CREAT SERPL: 17
CALCIUM SERPL-MCNC: 8.7 MG/DL (ref 8.5–10.1)
CHLORIDE SERPL-SCNC: 106 MMOL/L (ref 98–107)
CO2 SERPL-SCNC: 22 MMOL/L (ref 21–32)
CREAT SERPL-MCNC: 0.78 MG/DL (ref 0.6–1.3)
EOSINOPHIL # BLD AUTO: 0.2 10^3/UL (ref 0–0.3)
EOSINOPHIL NFR BLD AUTO: 2 % (ref 0–10)
GFR SERPLBLD BASED ON 1.73 SQ M-ARVRAT: > 60 ML/MIN
GLUCOSE SERPL-MCNC: 93 MG/DL (ref 70–105)
HCT VFR BLD CALC: 37 % (ref 40–54)
HGB BLD-MCNC: 12 G/DL (ref 13.3–17.7)
LYMPHOCYTES # BLD AUTO: 2.2 10^3/UL (ref 1–4)
LYMPHOCYTES NFR BLD AUTO: 19 % (ref 12–44)
MANUAL DIFFERENTIAL PERFORMED BLD QL: NO
MCH RBC QN AUTO: 27 PG (ref 25–34)
MCHC RBC AUTO-ENTMCNC: 33 G/DL (ref 32–36)
MCV RBC AUTO: 84 FL (ref 80–99)
MONOCYTES # BLD AUTO: 2.1 10^3/UL (ref 0–1)
MONOCYTES NFR BLD AUTO: 18 % (ref 0–12)
NEUTROPHILS # BLD AUTO: 6.9 10^3/UL (ref 1.8–7.8)
NEUTROPHILS NFR BLD AUTO: 58 % (ref 42–75)
PLATELET # BLD: 75 10^3/UL (ref 130–400)
POTASSIUM SERPL-SCNC: 4.3 MMOL/L (ref 3.6–5)
PROT SERPL-MCNC: 6.6 GM/DL (ref 6.4–8.2)
SODIUM SERPL-SCNC: 137 MMOL/L (ref 135–145)
WBC # BLD AUTO: 11.8 10^3/UL (ref 4.3–11)

## 2021-04-07 ENCOUNTER — HOSPITAL ENCOUNTER (OUTPATIENT)
Dept: HOSPITAL 75 - ONC | Age: 76
LOS: 6 days | Discharge: HOME | End: 2021-04-13
Attending: INTERNAL MEDICINE
Payer: OTHER GOVERNMENT

## 2021-04-07 DIAGNOSIS — D69.6: ICD-10-CM

## 2021-04-07 DIAGNOSIS — C25.2: ICD-10-CM

## 2021-04-07 DIAGNOSIS — C78.7: ICD-10-CM

## 2021-04-07 DIAGNOSIS — D61.818: ICD-10-CM

## 2021-04-07 DIAGNOSIS — Z90.81: ICD-10-CM

## 2021-04-07 DIAGNOSIS — Z51.11: Primary | ICD-10-CM

## 2021-04-07 DIAGNOSIS — Z90.410: ICD-10-CM

## 2021-04-07 DIAGNOSIS — J43.9: ICD-10-CM

## 2021-04-07 DIAGNOSIS — R91.8: ICD-10-CM

## 2021-04-07 DIAGNOSIS — Z92.21: ICD-10-CM

## 2021-04-07 PROCEDURE — 36591 DRAW BLOOD OFF VENOUS DEVICE: CPT

## 2021-04-07 PROCEDURE — 85025 COMPLETE CBC W/AUTO DIFF WBC: CPT

## 2021-04-07 PROCEDURE — 96413 CHEMO IV INFUSION 1 HR: CPT

## 2021-04-07 PROCEDURE — 80053 COMPREHEN METABOLIC PANEL: CPT

## 2021-04-07 PROCEDURE — 84443 ASSAY THYROID STIM HORMONE: CPT

## 2021-04-07 PROCEDURE — 86301 IMMUNOASSAY TUMOR CA 19-9: CPT

## 2021-04-28 LAB
ALBUMIN SERPL-MCNC: 3.7 GM/DL (ref 3.2–4.5)
ALP SERPL-CCNC: 64 U/L (ref 40–136)
ALT SERPL-CCNC: 15 U/L (ref 0–55)
BASOPHILS # BLD AUTO: 0.2 10^3/UL (ref 0–0.1)
BASOPHILS NFR BLD AUTO: 1 % (ref 0–10)
BILIRUB SERPL-MCNC: 0.5 MG/DL (ref 0.1–1)
BUN/CREAT SERPL: 19
CALCIUM SERPL-MCNC: 8.7 MG/DL (ref 8.5–10.1)
CHLORIDE SERPL-SCNC: 105 MMOL/L (ref 98–107)
CO2 SERPL-SCNC: 20 MMOL/L (ref 21–32)
CREAT SERPL-MCNC: 0.78 MG/DL (ref 0.6–1.3)
EOSINOPHIL # BLD AUTO: 0.2 10^3/UL (ref 0–0.3)
EOSINOPHIL NFR BLD AUTO: 1 % (ref 0–10)
GFR SERPLBLD BASED ON 1.73 SQ M-ARVRAT: > 60 ML/MIN
GLUCOSE SERPL-MCNC: 86 MG/DL (ref 70–105)
HCT VFR BLD CALC: 37 % (ref 40–54)
HGB BLD-MCNC: 12 G/DL (ref 13.3–17.7)
LYMPHOCYTES # BLD AUTO: 2.7 10^3/UL (ref 1–4)
LYMPHOCYTES NFR BLD AUTO: 18 % (ref 12–44)
MANUAL DIFFERENTIAL PERFORMED BLD QL: NO
MCH RBC QN AUTO: 28 PG (ref 25–34)
MCHC RBC AUTO-ENTMCNC: 33 G/DL (ref 32–36)
MCV RBC AUTO: 84 FL (ref 80–99)
MONOCYTES # BLD AUTO: 2.1 10^3/UL (ref 0–1)
MONOCYTES NFR BLD AUTO: 14 % (ref 0–12)
NEUTROPHILS # BLD AUTO: 9.4 10^3/UL (ref 1.8–7.8)
NEUTROPHILS NFR BLD AUTO: 62 % (ref 42–75)
PLATELET # BLD: 53 10^3/UL (ref 130–400)
PMV BLD AUTO: (no result) FL (ref 9–12.2)
POTASSIUM SERPL-SCNC: 4.5 MMOL/L (ref 3.6–5)
PROT SERPL-MCNC: 6.7 GM/DL (ref 6.4–8.2)
SODIUM SERPL-SCNC: 138 MMOL/L (ref 135–145)
WBC # BLD AUTO: 15 10^3/UL (ref 4.3–11)

## 2021-05-03 ENCOUNTER — HOSPITAL ENCOUNTER (INPATIENT)
Dept: HOSPITAL 75 - ER FS | Age: 76
LOS: 2 days | Discharge: NURSING, REHAB OR LONG-TERM CARE | DRG: 189 | End: 2021-05-05
Attending: INTERNAL MEDICINE | Admitting: INTERNAL MEDICINE
Payer: COMMERCIAL

## 2021-05-03 VITALS — SYSTOLIC BLOOD PRESSURE: 117 MMHG | DIASTOLIC BLOOD PRESSURE: 67 MMHG

## 2021-05-03 VITALS — WEIGHT: 106.26 LBS | HEIGHT: 67.99 IN | BODY MASS INDEX: 16.1 KG/M2

## 2021-05-03 DIAGNOSIS — J96.21: Primary | ICD-10-CM

## 2021-05-03 DIAGNOSIS — C25.9: ICD-10-CM

## 2021-05-03 DIAGNOSIS — C78.7: ICD-10-CM

## 2021-05-03 DIAGNOSIS — J43.9: ICD-10-CM

## 2021-05-03 DIAGNOSIS — E03.9: ICD-10-CM

## 2021-05-03 DIAGNOSIS — Z79.52: ICD-10-CM

## 2021-05-03 DIAGNOSIS — F17.210: ICD-10-CM

## 2021-05-03 DIAGNOSIS — C78.00: ICD-10-CM

## 2021-05-03 DIAGNOSIS — J18.9: ICD-10-CM

## 2021-05-03 DIAGNOSIS — J96.22: ICD-10-CM

## 2021-05-03 LAB
ACANTHOCYTES BLD QL SMEAR: SLIGHT
ALBUMIN SERPL-MCNC: 4.1 GM/DL (ref 3.2–4.5)
ALP SERPL-CCNC: 83 U/L (ref 40–136)
ALT SERPL-CCNC: 14 U/L (ref 0–55)
BASOPHILS # BLD AUTO: 0.2 10^3/UL (ref 0–0.1)
BASOPHILS NFR BLD AUTO: 1 % (ref 0–10)
BASOPHILS NFR BLD MANUAL: 0 %
BILIRUB SERPL-MCNC: 0.4 MG/DL (ref 0.1–1)
BUN/CREAT SERPL: 17
CALCIUM SERPL-MCNC: 9.3 MG/DL (ref 8.5–10.1)
CHLORIDE SERPL-SCNC: 99 MMOL/L (ref 98–107)
CO2 SERPL-SCNC: 24 MMOL/L (ref 21–32)
CREAT SERPL-MCNC: 0.71 MG/DL (ref 0.6–1.3)
EOSINOPHIL # BLD AUTO: 0.1 10^3/UL (ref 0–0.3)
EOSINOPHIL NFR BLD AUTO: 1 % (ref 0–10)
EOSINOPHIL NFR BLD MANUAL: 1 %
GFR SERPLBLD BASED ON 1.73 SQ M-ARVRAT: > 60 ML/MIN
GLUCOSE SERPL-MCNC: 110 MG/DL (ref 70–105)
HCT VFR BLD CALC: 40 % (ref 40–54)
HGB BLD-MCNC: 13.1 G/DL (ref 13.3–17.7)
LYMPHOCYTES # BLD AUTO: 2.2 X 10^3 (ref 1–4)
LYMPHOCYTES NFR BLD AUTO: 12 % (ref 12–44)
MAGNESIUM SERPL-MCNC: 2.3 MG/DL (ref 1.6–2.4)
MANUAL DIFFERENTIAL PERFORMED BLD QL: YES
MCH RBC QN AUTO: 27 PG (ref 25–34)
MCHC RBC AUTO-ENTMCNC: 33 G/DL (ref 32–36)
MCV RBC AUTO: 84 FL (ref 80–99)
MONOCYTES # BLD AUTO: 3.7 X 10^3 (ref 0–1)
MONOCYTES NFR BLD AUTO: 19 % (ref 0–12)
MONOCYTES NFR BLD: 12 %
NEUTROPHILS # BLD AUTO: 12.3 X 10^3 (ref 1.8–7.8)
NEUTROPHILS NFR BLD AUTO: 64 % (ref 42–75)
NEUTS BAND NFR BLD MANUAL: 73 %
NEUTS BAND NFR BLD: 5 %
PLATELET # BLD: 63 10^3/UL (ref 130–400)
POTASSIUM SERPL-SCNC: 4.1 MMOL/L (ref 3.6–5)
PROT SERPL-MCNC: 7.5 GM/DL (ref 6.4–8.2)
SODIUM SERPL-SCNC: 133 MMOL/L (ref 135–145)
VARIANT LYMPHS NFR BLD MANUAL: 9 %
WBC # BLD AUTO: 19.2 10^3/UL (ref 4.3–11)

## 2021-05-03 PROCEDURE — 84145 PROCALCITONIN (PCT): CPT

## 2021-05-03 PROCEDURE — 71275 CT ANGIOGRAPHY CHEST: CPT

## 2021-05-03 PROCEDURE — 71046 X-RAY EXAM CHEST 2 VIEWS: CPT

## 2021-05-03 PROCEDURE — 71045 X-RAY EXAM CHEST 1 VIEW: CPT

## 2021-05-03 PROCEDURE — 36600 WITHDRAWAL OF ARTERIAL BLOOD: CPT

## 2021-05-03 PROCEDURE — 85007 BL SMEAR W/DIFF WBC COUNT: CPT

## 2021-05-03 PROCEDURE — 94660 CPAP INITIATION&MGMT: CPT

## 2021-05-03 PROCEDURE — 36415 COLL VENOUS BLD VENIPUNCTURE: CPT

## 2021-05-03 PROCEDURE — 85027 COMPLETE CBC AUTOMATED: CPT

## 2021-05-03 PROCEDURE — 93005 ELECTROCARDIOGRAM TRACING: CPT

## 2021-05-03 PROCEDURE — 94760 N-INVAS EAR/PLS OXIMETRY 1: CPT

## 2021-05-03 PROCEDURE — 80053 COMPREHEN METABOLIC PANEL: CPT

## 2021-05-03 PROCEDURE — 83735 ASSAY OF MAGNESIUM: CPT

## 2021-05-03 PROCEDURE — 82805 BLOOD GASES W/O2 SATURATION: CPT

## 2021-05-03 PROCEDURE — 83880 ASSAY OF NATRIURETIC PEPTIDE: CPT

## 2021-05-03 PROCEDURE — 83605 ASSAY OF LACTIC ACID: CPT

## 2021-05-03 PROCEDURE — 80048 BASIC METABOLIC PNL TOTAL CA: CPT

## 2021-05-03 PROCEDURE — 94640 AIRWAY INHALATION TREATMENT: CPT

## 2021-05-03 NOTE — DIAGNOSTIC IMAGING REPORT
INDICATION: Shortness of breath and hypoxia.



TECHNIQUE: PA and lateral chest obtained at 05:08 p.m. and

compared to 04/23/2020.



FINDINGS: Port-A-Cath is unchanged. The heart is normal in size.

There is marked hyperinflation, compatible with COPD. There is no

new consolidation or pneumothorax or pleural fluid. There are

calcified granuloma in the right base.



IMPRESSION: COPD changes with no new infiltrate or pleural fluid.



Dictated by: 



  Dictated on workstation # [id number]

## 2021-05-03 NOTE — DIAGNOSTIC IMAGING REPORT
PROCEDURE: CT angiography Chest.



TECHNIQUE: After intravenous administration of contrast, thin

section axial CT angiography of the chest was performed. 3D MIP

reconstructions were made. All CT scans use one or more of the

following dose optimizing techniques: automated exposure control,

MA and/or KvP adjustment based on a patient size and exam type,

or iterative reconstruction. 



INDICATION: Shortness of breath. History of lung cancer.



COMPARISON: CT chest of 01/28/2021.



FINDINGS:



Vasculature: No pulmonary emboli. No CT evidence of pulmonary

hypertension or right ventricular strain.  Thoracic aorta is

normal in caliber. No aortic dissection or pseudoaneurysm.



Heart and mediastinum: Thyroid is not visualized and may be

hypoplastic or surgically absent. Stable right IJ Port-A-Cath. No

supraclavicular, axillary, or intra-thoracic lymphadenopathy. 

The heart is normal in size without pericardial effusion. 



Pleura: No pleural effusion or pneumothorax. 



Lungs and airway: Severe centrilobular emphysema is again noted.

The left upper lobe subpleural treated nodule is unchanged with

maximal thickness of approximately 6 mm. The more inferior left

upper lobe nodule is stable measuring approximately 6 mm as well.

Continued improvement in left lower lobe consolidations. No new

consolidation.



Upper abdomen: Allowing for the phase of contrast, no acute

abnormality in the upper abdomen is seen. 



Musculoskeletal: No concerning osseous lesion. 



IMPRESSION:

1. No pulmonary emboli or acute aortic syndrome.

2. No pneumonia. Continued improvement in left lower lobe

consolidations which are likely due to pneumonia or aspiration.



Dictated by: 



  Dictated on workstation # [id number]

## 2021-05-04 VITALS — DIASTOLIC BLOOD PRESSURE: 80 MMHG | SYSTOLIC BLOOD PRESSURE: 132 MMHG

## 2021-05-04 VITALS — DIASTOLIC BLOOD PRESSURE: 71 MMHG | SYSTOLIC BLOOD PRESSURE: 125 MMHG

## 2021-05-04 VITALS — SYSTOLIC BLOOD PRESSURE: 139 MMHG | DIASTOLIC BLOOD PRESSURE: 87 MMHG

## 2021-05-04 VITALS — DIASTOLIC BLOOD PRESSURE: 77 MMHG | SYSTOLIC BLOOD PRESSURE: 111 MMHG

## 2021-05-04 VITALS — SYSTOLIC BLOOD PRESSURE: 125 MMHG | DIASTOLIC BLOOD PRESSURE: 83 MMHG

## 2021-05-04 VITALS — SYSTOLIC BLOOD PRESSURE: 114 MMHG | DIASTOLIC BLOOD PRESSURE: 72 MMHG

## 2021-05-04 VITALS — DIASTOLIC BLOOD PRESSURE: 78 MMHG | SYSTOLIC BLOOD PRESSURE: 129 MMHG

## 2021-05-04 VITALS — SYSTOLIC BLOOD PRESSURE: 116 MMHG | DIASTOLIC BLOOD PRESSURE: 71 MMHG

## 2021-05-04 VITALS — DIASTOLIC BLOOD PRESSURE: 81 MMHG | SYSTOLIC BLOOD PRESSURE: 140 MMHG

## 2021-05-04 VITALS — SYSTOLIC BLOOD PRESSURE: 109 MMHG | DIASTOLIC BLOOD PRESSURE: 66 MMHG

## 2021-05-04 VITALS — SYSTOLIC BLOOD PRESSURE: 132 MMHG | DIASTOLIC BLOOD PRESSURE: 83 MMHG

## 2021-05-04 VITALS — DIASTOLIC BLOOD PRESSURE: 87 MMHG | SYSTOLIC BLOOD PRESSURE: 161 MMHG

## 2021-05-04 VITALS — SYSTOLIC BLOOD PRESSURE: 159 MMHG | DIASTOLIC BLOOD PRESSURE: 72 MMHG

## 2021-05-04 VITALS — DIASTOLIC BLOOD PRESSURE: 63 MMHG | SYSTOLIC BLOOD PRESSURE: 133 MMHG

## 2021-05-04 VITALS — SYSTOLIC BLOOD PRESSURE: 144 MMHG | DIASTOLIC BLOOD PRESSURE: 85 MMHG

## 2021-05-04 VITALS — SYSTOLIC BLOOD PRESSURE: 141 MMHG | DIASTOLIC BLOOD PRESSURE: 69 MMHG

## 2021-05-04 VITALS — DIASTOLIC BLOOD PRESSURE: 79 MMHG | SYSTOLIC BLOOD PRESSURE: 122 MMHG

## 2021-05-04 VITALS — DIASTOLIC BLOOD PRESSURE: 71 MMHG | SYSTOLIC BLOOD PRESSURE: 116 MMHG

## 2021-05-04 VITALS — SYSTOLIC BLOOD PRESSURE: 115 MMHG | DIASTOLIC BLOOD PRESSURE: 70 MMHG

## 2021-05-04 VITALS — DIASTOLIC BLOOD PRESSURE: 80 MMHG | SYSTOLIC BLOOD PRESSURE: 135 MMHG

## 2021-05-04 LAB
ARTERIAL PATENCY WRIST A: (no result)
BASE EXCESS STD BLDA CALC-SCNC: 4.1 MMOL/L (ref -2.5–2.5)
BDY SITE: (no result)
BODY TEMPERATURE: 36.8
BUN/CREAT SERPL: 21
CALCIUM SERPL-MCNC: 8.7 MG/DL (ref 8.5–10.1)
CHLORIDE SERPL-SCNC: 99 MMOL/L (ref 98–107)
CO2 BLDA CALC-SCNC: 31.7 MMOL/L (ref 21–31)
CO2 SERPL-SCNC: 25 MMOL/L (ref 21–32)
CREAT SERPL-MCNC: 0.72 MG/DL (ref 0.6–1.3)
GFR SERPLBLD BASED ON 1.73 SQ M-ARVRAT: > 60 ML/MIN
GLUCOSE SERPL-MCNC: 137 MG/DL (ref 70–105)
HCT VFR BLD CALC: 39 % (ref 40–54)
HGB BLD-MCNC: 12.4 G/DL (ref 13.3–17.7)
INHALED O2 FLOW RATE: 3 L/MIN
MCH RBC QN AUTO: 27 PG (ref 25–34)
MCHC RBC AUTO-ENTMCNC: 32 G/DL (ref 32–36)
MCV RBC AUTO: 85 FL (ref 80–99)
PCO2 BLDA: 60 MMHG (ref 35–45)
PH BLDA: 7.31 [PH] (ref 7.37–7.43)
PLATELET # BLD: 64 10^3/UL (ref 130–400)
PMV BLD AUTO: (no result) FL (ref 9–12.2)
PO2 BLDA: 86 MMHG (ref 79–93)
POTASSIUM SERPL-SCNC: 5 MMOL/L (ref 3.6–5)
SAO2 % BLDA FROM PO2: 97 % (ref 94–100)
SODIUM SERPL-SCNC: 135 MMOL/L (ref 135–145)
VENTILATION MODE VENT: NO
WBC # BLD AUTO: 14.3 10^3/UL (ref 4.3–11)

## 2021-05-04 PROCEDURE — 5A09457 ASSISTANCE WITH RESPIRATORY VENTILATION, 24-96 CONSECUTIVE HOURS, CONTINUOUS POSITIVE AIRWAY PRESSURE: ICD-10-PCS | Performed by: FAMILY MEDICINE

## 2021-05-04 RX ADMIN — IPRATROPIUM BROMIDE AND ALBUTEROL SULFATE SCH ML: .5; 3 SOLUTION RESPIRATORY (INHALATION) at 22:03

## 2021-05-04 RX ADMIN — IPRATROPIUM BROMIDE AND ALBUTEROL SULFATE SCH ML: .5; 3 SOLUTION RESPIRATORY (INHALATION) at 14:54

## 2021-05-04 RX ADMIN — SODIUM CHLORIDE SCH MLS/HR: 900 INJECTION INTRAVENOUS at 11:11

## 2021-05-04 RX ADMIN — IPRATROPIUM BROMIDE AND ALBUTEROL SULFATE SCH ML: .5; 3 SOLUTION RESPIRATORY (INHALATION) at 08:53

## 2021-05-04 RX ADMIN — Medication SCH ML: at 05:21

## 2021-05-04 RX ADMIN — IPRATROPIUM BROMIDE AND ALBUTEROL SULFATE SCH ML: .5; 3 SOLUTION RESPIRATORY (INHALATION) at 18:26

## 2021-05-04 RX ADMIN — FLUTICASONE PROPIONATE AND SALMETEROL SCH EACH: 232; 14 POWDER, METERED RESPIRATORY (INHALATION) at 20:39

## 2021-05-04 RX ADMIN — IPRATROPIUM BROMIDE AND ALBUTEROL SULFATE SCH ML: .5; 3 SOLUTION RESPIRATORY (INHALATION) at 01:44

## 2021-05-04 RX ADMIN — Medication SCH ML: at 14:33

## 2021-05-04 RX ADMIN — Medication SCH ML: at 20:19

## 2021-05-04 RX ADMIN — IPRATROPIUM BROMIDE AND ALBUTEROL SULFATE SCH ML: .5; 3 SOLUTION RESPIRATORY (INHALATION) at 22:53

## 2021-05-04 RX ADMIN — IPRATROPIUM BROMIDE AND ALBUTEROL SULFATE SCH ML: .5; 3 SOLUTION RESPIRATORY (INHALATION) at 08:09

## 2021-05-04 NOTE — HISTORY & PHYSICAL-HOSPITALIST
History of Present Illness


HPI/Chief Complaint


Pt is a 76yoCM with a PMH of metastatic pancreatic cancer on Keytruda, COPD, and

hypothyroidism who presented to the ER due to shortness of breath. It started 

abruptly on  and continued to worsen prompting him to seek evaluation in the 

ER yesterday. He had also started an oral antibiotic by the cancer center 

yesterday and took two doses of that. He originally had improvement with a 

breathing treatment but now states he's more short of breath and is awaiting a 

prn breathing treatment.


Source:  patient


Date Seen


21


Time Seen by a Provider:  12:14


Attending Physician


Chapo Solano MD


PCP


Danika Polanco


Referring Physician





Date of Admission


May 3, 2021 at 22:17





Home Medications & Allergies


Home Medications


Reviewed patient Home Medication Reconciliation performed by pharmacy medication

reconciliations technician and/or nursing.


Patients Allergies have been reviewed.





Allergies





Allergies


Coded Allergies


  No Known Drug Allergies (Unverified20)








Patient Social History


Marrital Status:  


Tobacco Use?:  Yes


Tobacco type used:  Cigarettes


Smoking Status:  Current Everyday Smoker


Substance use?:  No


Alcohol Use?:  No


Pt stated abuse/neglect:  No





Immunizations Up To Date


Influenza Vaccine Up-to-Date:  Yes; Up-to-Date


First/Initial COVID19 Vaccinat:  3/22/21


Second COVID19 Vaccination Reed:  [facility]


Date of Pneumonia Vaccine:  Oct 1, 2019





Current Status


Do you have an Advance Directi:  Yes


Advance Directive Location:  Home


Communicates:  Verbally


Primary Language:  English


Implanted or Applied Medical D:  None





Past Medical History





Hypothyroidism, metastatic pancreatic cancer





Family Medical History


Family Hx:  


Non contributory





Review of Systems


Constitutional:  No chills, No fever


EENTM:  no symptoms reported


Respiratory:  No cough; dyspnea on exertion, short of breath, wheezing


Cardiovascular:  no symptoms reported


Gastrointestinal:  No constipation, No diarrhea, No nausea, No vomiting


Genitourinary:  no symptoms reported


Musculoskeletal:  no symptoms reported


Skin:  no symptoms reported


Psychiatric/Neurological:  No Symptoms Reported





Physical Exam


Physical Exam


Vital Signs





Vital Signs - First Documented








 5/3/21





 17:01


 


Temp 37.7


 


Pulse 107


 


Resp 16


 


B/P (MAP) 149/65 (93)


 


Pulse Ox 96


 


O2 Delivery Nasal Cannula


 


O2 Flow Rate 3.00





Capillary Refill : Less Than 3 Seconds


Height, Weight, BMI


Height: '"


Weight: lbs. oz. kg; 16.39 BMI


Method:


General Appearance:  No Apparent Distress, Chronically ill, Thin


HEENT:  PERRL/EOMI, Moist Mucous Membranes


Neck:  Normal Inspection, Supple


Respiratory:  No Accessory Muscle Use, Crackles; No Wheezing


Cardiovascular:  Regular Rate, Rhythm, No Murmur


Gastrointestinal:  Normal Bowel Sounds, Non Tender, Soft


Extremity:  No Calf Tenderness, No Pedal Edema


Neurologic/Psychiatric:  Alert, Oriented x3, Normal Mood/Affect


Skin:  Normal Color, Warm/Dry





Results


Results/Procedures


Labs


Laboratory Tests


5/3/21 16:50








21 09:35








Patient resulted labs reviewed.


Imaging:  Reviewed Imaging Report


Imaging


                 [facility] [place] [place]





NAME:   MAY DEAN


[facility] REC#:   [id number]


ACCOUNT#:   [id number]


PT STATUS:   REG ER


:   1945


PHYSICIAN:   NAHEED VELÁZQUEZ DO


ADMIT DATE:   21/ER FS


                                  ***Signed***


Date of Exam:21





CHEST PA/LAT (2 VIEW)








INDICATION: Shortness of breath and hypoxia.





TECHNIQUE: PA and lateral chest obtained at 05:08 p.m. and


compared to 2020.





FINDINGS: Port-A-Cath is unchanged. The heart is normal in size.


There is marked hyperinflation, compatible with COPD. There is no


new consolidation or pneumothorax or pleural fluid. There are


calcified granuloma in the right base.





IMPRESSION: COPD changes with no new infiltrate or pleural fluid.





Dictated by: 





  Dictated on workstation # [id number]








Dict:   21 1720


Trans:   21


AS6 [id number]





Interpreted by:     EDU FERNANDEZ MD


Electronically signed by: EDU FERNANDEZ MD 21








                 [facility] [place] [place]





NAME:   DIANNEMAY SMILEY


[facility] REC#:   [id number]


ACCOUNT#:   [id number]


PT STATUS:   REG ER


:   1945


PHYSICIAN:   NAHEED VELÁZQUEZ DO


ADMIT DATE:   21/ER FS


                                  ***Signed***


Date of Exam:21





CT ANGIO CHEST W








PROCEDURE: CT angiography Chest.





TECHNIQUE: After intravenous administration of contrast, thin


section axial CT angiography of the chest was performed. 3D MIP


reconstructions were made. All CT scans use one or more of the


following dose optimizing techniques: automated exposure control,


MA and/or KvP adjustment based on a patient size and exam type,


or iterative reconstruction. 





INDICATION: Shortness of breath. History of lung cancer.





COMPARISON: CT chest of 2021.





FINDINGS:





Vasculature: No pulmonary emboli. No CT evidence of pulmonary


hypertension or right ventricular strain.  Thoracic aorta is


normal in caliber. No aortic dissection or pseudoaneurysm.





Heart and mediastinum: Thyroid is not visualized and may be


hypoplastic or surgically absent. Stable right IJ Port-A-Cath. No


supraclavicular, axillary, or intra-thoracic lymphadenopathy. 


The heart is normal in size without pericardial effusion. 





Pleura: No pleural effusion or pneumothorax. 





Lungs and airway: Severe centrilobular emphysema is again noted.


The left upper lobe subpleural treated nodule is unchanged with


maximal thickness of approximately 6 mm. The more inferior left


upper lobe nodule is stable measuring approximately 6 mm as well.


Continued improvement in left lower lobe consolidations. No new


consolidation.





Upper abdomen: Allowing for the phase of contrast, no acute


abnormality in the upper abdomen is seen. 





Musculoskeletal: No concerning osseous lesion. 





IMPRESSION:


1. No pulmonary emboli or acute aortic syndrome.


2. No pneumonia. Continued improvement in left lower lobe


consolidations which are likely due to pneumonia or aspiration.





Dictated by: 





  Dictated on workstation # [id number]








Dict:   21


Trans:   21 183


AS6 [id number]





Interpreted by:     HU SCOTT MD


Electronically signed by: HU SCOTT MD 21 [id number]





Assessment/Plan


Admission Diagnosis


Acute hypoxia respiratory failure


Admission Status:  Inpatient Order (span 2 midnights)


Reason for Inpatient Admission:  


see below





Assessment and Plan


Acute hypoxia respiratory failure


AECOPD


   Continue on steroids


   Start abx to continue CAP coerage from outpatient


   ABG ordered


   CTA with no PE


   Pulm consulted, appreciate recs





Metastatic pancreatic cancer


   primary pancreatic cancer with mets to liver and lung


   Maintained on Keytruda


   Discussed with Dr Morrow to ascertain current cancer status





Hypothyroidism


   Continue home meds





Diagnosis/Problems


Diagnosis/Problems





(1) COPD with acute exacerbation


Status:  Acute











TRISTEN MORRISON MD               May 4, 2021 09:09

## 2021-05-05 VITALS — DIASTOLIC BLOOD PRESSURE: 89 MMHG | SYSTOLIC BLOOD PRESSURE: 147 MMHG

## 2021-05-05 VITALS — DIASTOLIC BLOOD PRESSURE: 70 MMHG | SYSTOLIC BLOOD PRESSURE: 125 MMHG

## 2021-05-05 VITALS — DIASTOLIC BLOOD PRESSURE: 84 MMHG | SYSTOLIC BLOOD PRESSURE: 136 MMHG

## 2021-05-05 VITALS — SYSTOLIC BLOOD PRESSURE: 117 MMHG | DIASTOLIC BLOOD PRESSURE: 68 MMHG

## 2021-05-05 VITALS — DIASTOLIC BLOOD PRESSURE: 69 MMHG | SYSTOLIC BLOOD PRESSURE: 108 MMHG

## 2021-05-05 VITALS — DIASTOLIC BLOOD PRESSURE: 96 MMHG | SYSTOLIC BLOOD PRESSURE: 177 MMHG

## 2021-05-05 VITALS — DIASTOLIC BLOOD PRESSURE: 83 MMHG | SYSTOLIC BLOOD PRESSURE: 139 MMHG

## 2021-05-05 VITALS — SYSTOLIC BLOOD PRESSURE: 124 MMHG | DIASTOLIC BLOOD PRESSURE: 74 MMHG

## 2021-05-05 VITALS — SYSTOLIC BLOOD PRESSURE: 136 MMHG | DIASTOLIC BLOOD PRESSURE: 75 MMHG

## 2021-05-05 VITALS — SYSTOLIC BLOOD PRESSURE: 126 MMHG | DIASTOLIC BLOOD PRESSURE: 72 MMHG

## 2021-05-05 VITALS — DIASTOLIC BLOOD PRESSURE: 71 MMHG | SYSTOLIC BLOOD PRESSURE: 120 MMHG

## 2021-05-05 VITALS — SYSTOLIC BLOOD PRESSURE: 139 MMHG | DIASTOLIC BLOOD PRESSURE: 76 MMHG

## 2021-05-05 VITALS — SYSTOLIC BLOOD PRESSURE: 125 MMHG | DIASTOLIC BLOOD PRESSURE: 70 MMHG

## 2021-05-05 VITALS — DIASTOLIC BLOOD PRESSURE: 81 MMHG | SYSTOLIC BLOOD PRESSURE: 136 MMHG

## 2021-05-05 VITALS — SYSTOLIC BLOOD PRESSURE: 113 MMHG | DIASTOLIC BLOOD PRESSURE: 68 MMHG

## 2021-05-05 VITALS — SYSTOLIC BLOOD PRESSURE: 144 MMHG | DIASTOLIC BLOOD PRESSURE: 88 MMHG

## 2021-05-05 VITALS — SYSTOLIC BLOOD PRESSURE: 151 MMHG | DIASTOLIC BLOOD PRESSURE: 87 MMHG

## 2021-05-05 LAB
ARTERIAL PATENCY WRIST A: (no result)
BASE EXCESS STD BLDA CALC-SCNC: 4.3 MMOL/L (ref -2.5–2.5)
BDY SITE: (no result)
BODY TEMPERATURE: 35.9
BUN/CREAT SERPL: 35
CALCIUM SERPL-MCNC: 8.8 MG/DL (ref 8.5–10.1)
CHLORIDE SERPL-SCNC: 99 MMOL/L (ref 98–107)
CO2 BLDA CALC-SCNC: 32.3 MMOL/L (ref 21–31)
CO2 SERPL-SCNC: 27 MMOL/L (ref 21–32)
CREAT SERPL-MCNC: 0.8 MG/DL (ref 0.6–1.3)
GFR SERPLBLD BASED ON 1.73 SQ M-ARVRAT: > 60 ML/MIN
GLUCOSE SERPL-MCNC: 99 MG/DL (ref 70–105)
HCT VFR BLD CALC: 39 % (ref 40–54)
HGB BLD-MCNC: 12.4 G/DL (ref 13.3–17.7)
INHALED O2 FLOW RATE: (no result) L/MIN
MCH RBC QN AUTO: 27 PG (ref 25–34)
MCHC RBC AUTO-ENTMCNC: 32 G/DL (ref 32–36)
MCV RBC AUTO: 85 FL (ref 80–99)
PCO2 BLDA: 60 MMHG (ref 35–45)
PH BLDA: 7.31 [PH] (ref 7.37–7.43)
PLATELET # BLD: 56 10^3/UL (ref 130–400)
PMV BLD AUTO: (no result) FL (ref 9–12.2)
PO2 BLDA: 91 MMHG (ref 79–93)
POTASSIUM SERPL-SCNC: 5 MMOL/L (ref 3.6–5)
SAO2 % BLDA FROM PO2: 97 % (ref 94–100)
SODIUM SERPL-SCNC: 137 MMOL/L (ref 135–145)
VENTILATION MODE VENT: YES
WBC # BLD AUTO: 11.8 10^3/UL (ref 4.3–11)

## 2021-05-05 RX ADMIN — METHYLPREDNISOLONE SODIUM SUCCINATE SCH MG: 40 INJECTION, POWDER, FOR SOLUTION INTRAMUSCULAR; INTRAVENOUS at 06:08

## 2021-05-05 RX ADMIN — IPRATROPIUM BROMIDE AND ALBUTEROL SULFATE SCH ML: .5; 3 SOLUTION RESPIRATORY (INHALATION) at 02:15

## 2021-05-05 RX ADMIN — METHYLPREDNISOLONE SODIUM SUCCINATE SCH MG: 40 INJECTION, POWDER, FOR SOLUTION INTRAMUSCULAR; INTRAVENOUS at 13:46

## 2021-05-05 RX ADMIN — Medication SCH ML: at 13:46

## 2021-05-05 RX ADMIN — SODIUM CHLORIDE SCH MLS/HR: 900 INJECTION INTRAVENOUS at 08:39

## 2021-05-05 RX ADMIN — Medication SCH ML: at 06:07

## 2021-05-05 RX ADMIN — FLUTICASONE PROPIONATE AND SALMETEROL SCH EACH: 232; 14 POWDER, METERED RESPIRATORY (INHALATION) at 07:30

## 2021-05-05 RX ADMIN — IPRATROPIUM BROMIDE AND ALBUTEROL SULFATE SCH ML: .5; 3 SOLUTION RESPIRATORY (INHALATION) at 07:31

## 2021-05-05 RX ADMIN — IPRATROPIUM BROMIDE AND ALBUTEROL SULFATE SCH ML: .5; 3 SOLUTION RESPIRATORY (INHALATION) at 11:13
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
## 2021-05-05 NOTE — DISCHARGE SUMMARY
Diagnosis/Chief Complaint


Date of Admission


May 3, 2021 at 22:17


Date of Discharge





Discharge Date:  May 5, 2021


Admission Diagnosis


Acute hypoxia respiratory failure


Primary Care


CollinDanika Angela


Discharge Diagnosis





(1) COPD with acute exacerbation


Status:  Acute





Discharge Summary


Discharge Physical Exam


Allergies:  


Coded Allergies:  


     No Known Drug Allergies (Unverified , 1/31/20)


Vitals & I&Os





Vital Signs








  Date Time  Temp Pulse Resp B/P (MAP) Pulse Ox O2 Delivery O2 Flow Rate FiO2


 


5/5/21 11:16  93 21  95  28.00 


 


5/5/21 11:13      NIV Bilevel  28


 


5/5/21 11:00    126/72 (90)    


 


5/5/21 07:30 36.3       








General Appearance:  No Apparent Distress, Chronically ill, Thin


Respiratory:  No Accessory Muscle Use, Rhonci, Other (on BiPAP)


Cardiovascular:  Regular Rate, Rhythm, No Murmur


Neurologic/Psychiatric:  Alert, Oriented x3





Hospital Course


Pt was admitted to the hospital due to acute hypoxic respiratory failure. He was

treated with IV steroids and IV antibiotics to cover for CAP and COPD 

exacerbation. He unfortunately progressed to needing BiPAP and was unable to be 

weaned off. He was unable to even tolerate being off long enough to take in an 

inhaler. Pulmonary was consulted. He was deemed an appropriate candidate for 

LTACH for BiPAP weaning. He requested transfer there today.


Labs (last 24 hrs)


Laboratory Tests


5/5/21 02:34: 


White Blood Count 11.8H, Red Blood Count 4.60, Hemoglobin 12.4L, Hematocrit 39L,

Mean Corpuscular Volume 85, Mean Corpuscular Hemoglobin 27, Mean Corpuscular 

Hemoglobin Concent 32, Red Cell Distribution Width 23.9H, Platelet Count 56L, 

Mean Platelet Volume , Sodium Level 137, Potassium Level 5.0, Chloride Level 99,

Carbon Dioxide Level 27, Anion Gap 11, Blood Urea Nitrogen 28H, Creatinine 0.80,

Estimat Glomerular Filtration Rate > 60, BUN/Creatinine Ratio 35, Glucose Level 

99, Calcium Level 8.8, Magnesium Level 2.5H, Procalcitonin 0.07


5/5/21 03:03: 


Blood Gas Puncture Site RIGHT RADIAL, Blood Gas Patient Temperature 35.9, 

Arterial Blood pH 7.31*L, Arterial Blood Partial Pressure CO2 60H, Arterial 

Blood Partial Pressure O2 91, Arterial Blood HCO3 30H, Arterial Blood Total CO2 

32.3H, Arterial Blood Oxygen Saturation 97, Arterial Blood Base Excess 4.3H, 

Vance Test YES-POS, Blood Gas Ventilator Setting YES, Blood Gas Inspired Oxygen 

25%


5/5/21 05:35: Lactic Acid Level 1.78


Patient resulted labs reviewed.


Pending Labs


Laboratory Tests


5/5/21 05:35: Lactic Acid Level 1.78





Imaging:  Reviewed Imaging Report





Discussion & Recommendations


Discharge Planning:  >30 minutes discharge planning





Discharge


Home Medications:





Active Scripts


Active


Reported


Proair Hfa (Albuterol Sulfate) 1 Puff Puff 2 Puff IH Q4H PRN


Spiriva Respimat 2.5MCG/ACTUATION (Tiotropium Bromide) 4 Gm Mist.inhal 2 Puff IH

DAILY


Symbicort 160-4.5 Mcg Inhaler (Budesonide/Formoterol Fumarate) 10.2 Gm 

Hfa.aer.ad 2 Puff IH BID


Atorvastatin Calcium 80 Mg Tablet 40 Mg PO HS


     TAKES  OF AN 80MG TAB


Synthroid (Levothyroxine Sodium) 112 Mcg Tablet 112 Mcg PO DAILY





Instructions to patient/family


Please see electronic discharge instructions given to patient.











TRISTEN MORRISON MD               May 5, 2021 13:09

## 2021-05-05 NOTE — DIAGNOSTIC IMAGING REPORT
INDICATION: 

Respiratory distress.



COMPARISON:  

05/03/2021.



FINDINGS: 

There is again noted severe obstructive bullous emphysematous

disease. No acute infiltrates. No pneumothorax or pleural

effusion. The heart is not enlarged. The Port-A-Cath on the right

remains in good position.



IMPRESSION: 

Severe COPD without acute change.



Dictated by: 



  Dictated on workstation # [id number]

## 2021-05-05 NOTE — PROGRESS NOTE - HOSPITALIST
Subjective


HPI/CC On Admission


Date Seen by Provider:  May 5, 2021


Time Seen by Provider:  07:58


Pt is a 76yoCM with a PMH of metastatic pancreatic cancer on Keytruda, COPD, and

hypothyroidism who presented to the ER due to shortness of breath. It started 

abruptly on 5/1 and continued to worsen prompting him to seek evaluation in the 

ER yesterday. He had also started an oral antibiotic by the cancer center 

yesterday and took two doses of that. He originally had improvement with a 

breathing treatment but now states he's more short of breath and is awaiting a 

prn breathing treatment.


Subjective/Events-last exam


Pt reports feeling a little better today on BiPAP. ABG relatively unchanged 

though. Patient states that he was told this morning he needed a ventilator. I 

told him at this moment he doesn't but if he worsens he may. He then asked what 

would happen if he declined a ventilator and I stated if he got to the point 

where he needed one and refused it he would probably die. He nodded and said 

that would be ok. He then asked me to tell his wife that. I informed him I would

be happy to have that conversation with him and his wife but it would be best to

do it together.





Focused Exam


Lactate Level


5/5/21 05:35: Lactic Acid Level 1.78





Lactic Acid Level





Laboratory Tests








Test


 5/5/21


05:35


 


Lactic Acid Level


 1.78 MMOL/L


(0.50-2.00)











Objective


Exam


Vital Signs





Vital Signs








  Date Time  Temp Pulse Resp B/P (MAP) Pulse Ox O2 Delivery O2 Flow Rate FiO2


 


5/5/21 07:45      NIV Bilevel  28


 


5/5/21 07:30 36.3       


 


5/5/21 06:07       21.00 


 


5/5/21 06:00  89  139/76 (97) 90   


 


5/5/21 05:00   13     





Capillary Refill : Less Than 3 Seconds


General Appearance:  Anxious, Chronically ill, Mild Distress (on BiPAP but 

decreased work of breathing), Thin


Respiratory:  No Accessory Muscle Use, Rhonci, Other (on BiPAP)


Cardiovascular:  Regular Rate, Rhythm, No Murmur


Gastrointestinal:  Normal Bowel Sounds, Non Tender, Soft


Extremity:  No Calf Tenderness, No Pedal Edema


Neurologic/Psychiatric:  Alert, Oriented x3





Results/Procedures


Lab


Laboratory Tests


5/4/21 09:35








5/5/21 02:34








Patient resulted labs reviewed.


Imaging:  Reviewed Imaging Report





Assessment/Plan


Assessment and Plan


Assess & Plan/Chief Complaint


Acute hypoxic and hypercapnic respiratory failure


AECOPD


   Continue on steroids


   Continue abx


   ABG unchanged this morning from yesterday but work of breathing much improved


   CTA with no PE


   Pulm consulted, appreciate recs


   tolerating BiPAP well but does not tolerate any time off of it, could be a 

good [place] candidate as I anticipate he will take a while to wean off





Metastatic pancreatic cancer


   primary pancreatic cancer with mets to liver and lung


   Maintained on Keytruda- may resume


   Discussed with Dr Morrow to ascertain current cancer status





Hypothyroidism


   Continue home meds





Diagnosis/Problems


Diagnosis/Problems





(1) COPD with acute exacerbation


Status:  Acute











TRISTEN MORRISON MD               May 5, 2021 08:04

## 2021-06-03 ENCOUNTER — HOSPITAL ENCOUNTER (OUTPATIENT)
Dept: HOSPITAL 75 - RAD | Age: 76
End: 2021-06-03
Attending: INTERNAL MEDICINE
Payer: COMMERCIAL

## 2021-06-03 DIAGNOSIS — C25.2: Primary | ICD-10-CM

## 2021-06-03 PROCEDURE — 71260 CT THORAX DX C+: CPT

## 2021-06-03 PROCEDURE — 74178 CT ABD&PLV WO CNTR FLWD CNTR: CPT

## 2021-06-03 NOTE — DIAGNOSTIC IMAGING REPORT
PROCEDURE: CT chest with contrast, CT abdomen and pelvis with and

without contrast.



TECHNIQUE: Pre and post intravenous contrast axial imaging of the

abdomen and pelvis and post contrast axial imaging of the chest

were performed. Auto Exposure Controls were utilized during the

CT exam to meet ALARA standards for radiation dose reduction. 



INDICATION: Malignant neoplasm of the tail of the pancreas.



COMPARISON: Comparison is made with prior CT chest study from

05/03/2021 and CT abdomen and pelvis study from 01/28/2021.



FINDINGS:



CT CHEST:



A right chest wall port has tip in the SVC. No axillary, hilar,

or mediastinal lymphadenopathy is seen. There is no pericardial

or pleural fluid identified. Centrilobular emphysematous changes

throughout both lungs are again noted. Previously noted irregular

nodule in the medial left upper lobe measures 5 mm compared with

6 mm. Density more inferiorly in the left upper lobe is stable at

6 mm. Right lower lobe subpleural nodules are stable. Subpleural

nodule in the inferior aspect of the left upper lobe is stable. A

nodule in the inferior right upper lobe, image 82, is stable at 4

mm. Chronic-appearing infiltrate in the left lower lobe appears

stable.



IMPRESSION: Stable CT chest when compared with prior exam dating

back to 01/28/2021. Left basilar chronic infiltrate is stable. No

new abnormality is identified.



CT ABDOMEN AND PELVIS:



No discrete liver mass is identified. The gallbladder is

unremarkable. No biliary ductal dilatation is seen. No definite

pancreatic mass or pancreatic ductal dilatation is identified.

The spleen is unremarkable. No adrenal mass is detected. Cyst in

the lower pole of the right kidney appears stable. No

hydronephrosis is detected. Aorta and iliac vessels are heavily

calcified but nonaneurysmal. Bowel loops remain normal in

caliber. There is no obstruction. There is moderate distention to

the urinary bladder. No free fluid is seen. No abdominal or

pelvic lymphadenopathy is identified. Bony structures are

nonacute.



IMPRESSION: Continued stable CT abdomen and pelvis since exam

from 01/28/2021. No definite evidence of metastatic disease or

lymphadenopathy is identified.



Dictated by: 



  Dictated on workstation # [id number]

## 2021-06-09 LAB
ALBUMIN SERPL-MCNC: 3.3 GM/DL (ref 3.2–4.5)
ALP SERPL-CCNC: 57 U/L (ref 40–136)
ALT SERPL-CCNC: 16 U/L (ref 0–55)
APTT PPP: YELLOW S
BACTERIA #/AREA URNS HPF: NEGATIVE /HPF
BASOPHILS # BLD AUTO: 0.1 10^3/UL (ref 0–0.1)
BASOPHILS NFR BLD AUTO: 1 % (ref 0–10)
BILIRUB SERPL-MCNC: 0.5 MG/DL (ref 0.1–1)
BILIRUB UR QL STRIP: NEGATIVE
BUN/CREAT SERPL: 19
CALCIUM SERPL-MCNC: 8.8 MG/DL (ref 8.5–10.1)
CHLORIDE SERPL-SCNC: 103 MMOL/L (ref 98–107)
CO2 SERPL-SCNC: 26 MMOL/L (ref 21–32)
CREAT SERPL-MCNC: 0.7 MG/DL (ref 0.6–1.3)
EOSINOPHIL # BLD AUTO: 0 10^3/UL (ref 0–0.3)
EOSINOPHIL NFR BLD AUTO: 0 % (ref 0–10)
FIBRINOGEN PPP-MCNC: CLEAR MG/DL
GFR SERPLBLD BASED ON 1.73 SQ M-ARVRAT: > 60 ML/MIN
GLUCOSE SERPL-MCNC: 129 MG/DL (ref 70–105)
GLUCOSE UR STRIP-MCNC: NEGATIVE MG/DL
HCT VFR BLD CALC: 28 % (ref 40–54)
HGB BLD-MCNC: 9 G/DL (ref 13.3–17.7)
KETONES UR QL STRIP: NEGATIVE
LEUKOCYTE ESTERASE UR QL STRIP: (no result)
LYMPHOCYTES # BLD AUTO: 1.8 10^3/UL (ref 1–4)
LYMPHOCYTES NFR BLD AUTO: 32 % (ref 12–44)
MANUAL DIFFERENTIAL PERFORMED BLD QL: NO
MCH RBC QN AUTO: 28 PG (ref 25–34)
MCHC RBC AUTO-ENTMCNC: 32 G/DL (ref 32–36)
MCV RBC AUTO: 87 FL (ref 80–99)
MONOCYTES # BLD AUTO: 0.7 10^3/UL (ref 0–1)
MONOCYTES NFR BLD AUTO: 12 % (ref 0–12)
NEUTROPHILS # BLD AUTO: 2.9 10^3/UL (ref 1.8–7.8)
NEUTROPHILS NFR BLD AUTO: 51 % (ref 42–75)
NITRITE UR QL STRIP: NEGATIVE
PH UR STRIP: 7 [PH] (ref 5–9)
PLATELET # BLD: 45 10^3/UL (ref 130–400)
PMV BLD AUTO: (no result) FL (ref 9–12.2)
POTASSIUM SERPL-SCNC: 3.8 MMOL/L (ref 3.6–5)
PROT SERPL-MCNC: 5.7 GM/DL (ref 6.4–8.2)
PROT UR QL STRIP: NEGATIVE
RBC #/AREA URNS HPF: (no result) /HPF
SODIUM SERPL-SCNC: 137 MMOL/L (ref 135–145)
SP GR UR STRIP: 1.01 (ref 1.02–1.02)
WBC # BLD AUTO: 5.6 10^3/UL (ref 4.3–11)
WBC #/AREA URNS HPF: (no result) /HPF

## 2021-07-21 ENCOUNTER — HOSPITAL ENCOUNTER (OUTPATIENT)
Dept: HOSPITAL 75 - ONC | Age: 76
LOS: 6 days | Discharge: HOME | End: 2021-07-27
Attending: INTERNAL MEDICINE
Payer: COMMERCIAL

## 2021-07-21 DIAGNOSIS — D61.818: ICD-10-CM

## 2021-07-21 DIAGNOSIS — J43.9: ICD-10-CM

## 2021-07-21 DIAGNOSIS — Z90.81: ICD-10-CM

## 2021-07-21 DIAGNOSIS — C78.7: ICD-10-CM

## 2021-07-21 DIAGNOSIS — D69.6: ICD-10-CM

## 2021-07-21 DIAGNOSIS — C25.2: ICD-10-CM

## 2021-07-21 DIAGNOSIS — Z92.21: ICD-10-CM

## 2021-07-21 DIAGNOSIS — Z90.410: ICD-10-CM

## 2021-07-21 DIAGNOSIS — R91.8: ICD-10-CM

## 2021-07-21 DIAGNOSIS — Z51.11: Primary | ICD-10-CM

## 2021-07-21 LAB
ALBUMIN SERPL-MCNC: 3.7 GM/DL (ref 3.2–4.5)
ALP SERPL-CCNC: 62 U/L (ref 40–136)
ALT SERPL-CCNC: 14 U/L (ref 0–55)
BASOPHILS # BLD AUTO: 0.1 10^3/UL (ref 0–0.1)
BASOPHILS NFR BLD AUTO: 1 % (ref 0–10)
BILIRUB SERPL-MCNC: 0.4 MG/DL (ref 0.1–1)
BUN/CREAT SERPL: 18
CALCIUM SERPL-MCNC: 9.2 MG/DL (ref 8.5–10.1)
CHLORIDE SERPL-SCNC: 103 MMOL/L (ref 98–107)
CO2 SERPL-SCNC: 26 MMOL/L (ref 21–32)
CREAT SERPL-MCNC: 0.79 MG/DL (ref 0.6–1.3)
EOSINOPHIL # BLD AUTO: 0.1 10^3/UL (ref 0–0.3)
EOSINOPHIL NFR BLD AUTO: 1 % (ref 0–10)
GFR SERPLBLD BASED ON 1.73 SQ M-ARVRAT: > 60 ML/MIN
GLUCOSE SERPL-MCNC: 105 MG/DL (ref 70–105)
HCT VFR BLD CALC: 35 % (ref 40–54)
HGB BLD-MCNC: 11.3 G/DL (ref 13.3–17.7)
LYMPHOCYTES # BLD AUTO: 2.5 10^3/UL (ref 1–4)
LYMPHOCYTES NFR BLD AUTO: 25 % (ref 12–44)
MANUAL DIFFERENTIAL PERFORMED BLD QL: NO
MCH RBC QN AUTO: 29 PG (ref 25–34)
MCHC RBC AUTO-ENTMCNC: 32 G/DL (ref 32–36)
MCV RBC AUTO: 89 FL (ref 80–99)
MONOCYTES # BLD AUTO: 2 10^3/UL (ref 0–1)
MONOCYTES NFR BLD AUTO: 19 % (ref 0–12)
NEUTROPHILS # BLD AUTO: 5.3 10^3/UL (ref 1.8–7.8)
NEUTROPHILS NFR BLD AUTO: 52 % (ref 42–75)
PLATELET # BLD: 56 10^3/UL (ref 130–400)
POTASSIUM SERPL-SCNC: 4 MMOL/L (ref 3.6–5)
PROT SERPL-MCNC: 6.2 GM/DL (ref 6.4–8.2)
SODIUM SERPL-SCNC: 136 MMOL/L (ref 135–145)
WBC # BLD AUTO: 10.2 10^3/UL (ref 4.3–11)

## 2021-07-21 PROCEDURE — 81000 URINALYSIS NONAUTO W/SCOPE: CPT

## 2021-07-21 PROCEDURE — 36591 DRAW BLOOD OFF VENOUS DEVICE: CPT

## 2021-07-21 PROCEDURE — 85025 COMPLETE CBC W/AUTO DIFF WBC: CPT

## 2021-07-21 PROCEDURE — 96413 CHEMO IV INFUSION 1 HR: CPT

## 2021-07-21 PROCEDURE — 86301 IMMUNOASSAY TUMOR CA 19-9: CPT

## 2021-07-21 PROCEDURE — 84443 ASSAY THYROID STIM HORMONE: CPT

## 2021-07-21 PROCEDURE — 80053 COMPREHEN METABOLIC PANEL: CPT

## 2021-09-01 LAB
ALBUMIN SERPL-MCNC: 3.9 GM/DL (ref 3.2–4.5)
ALP SERPL-CCNC: 61 U/L (ref 40–136)
ALT SERPL-CCNC: 19 U/L (ref 0–55)
BASOPHILS # BLD AUTO: 0.1 10^3/UL (ref 0–0.1)
BASOPHILS NFR BLD AUTO: 1 % (ref 0–10)
BILIRUB SERPL-MCNC: 0.3 MG/DL (ref 0.1–1)
BUN/CREAT SERPL: 20
CALCIUM SERPL-MCNC: 9.3 MG/DL (ref 8.5–10.1)
CHLORIDE SERPL-SCNC: 105 MMOL/L (ref 98–107)
CO2 SERPL-SCNC: 23 MMOL/L (ref 21–32)
CREAT SERPL-MCNC: 0.76 MG/DL (ref 0.6–1.3)
EOSINOPHIL # BLD AUTO: 0.1 10^3/UL (ref 0–0.3)
EOSINOPHIL NFR BLD AUTO: 1 % (ref 0–10)
GFR SERPLBLD BASED ON 1.73 SQ M-ARVRAT: 100 ML/MIN
GLUCOSE SERPL-MCNC: 89 MG/DL (ref 70–105)
HCT VFR BLD CALC: 37 % (ref 40–54)
HGB BLD-MCNC: 12.1 G/DL (ref 13.3–17.7)
LYMPHOCYTES # BLD AUTO: 2.9 10^3/UL (ref 1–4)
LYMPHOCYTES NFR BLD AUTO: 27 % (ref 12–44)
MANUAL DIFFERENTIAL PERFORMED BLD QL: NO
MCH RBC QN AUTO: 29 PG (ref 25–34)
MCHC RBC AUTO-ENTMCNC: 33 G/DL (ref 32–36)
MCV RBC AUTO: 88 FL (ref 80–99)
MONOCYTES # BLD AUTO: 1.7 10^3/UL (ref 0–1)
MONOCYTES NFR BLD AUTO: 15 % (ref 0–12)
NEUTROPHILS # BLD AUTO: 5.7 10^3/UL (ref 1.8–7.8)
NEUTROPHILS NFR BLD AUTO: 53 % (ref 42–75)
PLATELET # BLD: 63 10^3/UL (ref 130–400)
PMV BLD AUTO: (no result) FL (ref 9–12.2)
POTASSIUM SERPL-SCNC: 4.4 MMOL/L (ref 3.6–5)
PROT SERPL-MCNC: 6.6 GM/DL (ref 6.4–8.2)
SODIUM SERPL-SCNC: 136 MMOL/L (ref 135–145)
WBC # BLD AUTO: 10.8 10^3/UL (ref 4.3–11)

## 2021-09-10 ENCOUNTER — HOSPITAL ENCOUNTER (EMERGENCY)
Dept: HOSPITAL 75 - ER FS | Age: 76
Discharge: HOME | End: 2021-09-10
Payer: COMMERCIAL

## 2021-09-10 VITALS — WEIGHT: 119.93 LBS | BODY MASS INDEX: 18.18 KG/M2 | HEIGHT: 67.99 IN

## 2021-09-10 VITALS — DIASTOLIC BLOOD PRESSURE: 70 MMHG | SYSTOLIC BLOOD PRESSURE: 141 MMHG

## 2021-09-10 DIAGNOSIS — J44.9: ICD-10-CM

## 2021-09-10 DIAGNOSIS — Z20.822: ICD-10-CM

## 2021-09-10 DIAGNOSIS — E78.00: ICD-10-CM

## 2021-09-10 DIAGNOSIS — E03.9: ICD-10-CM

## 2021-09-10 DIAGNOSIS — Z79.899: ICD-10-CM

## 2021-09-10 DIAGNOSIS — Z79.890: ICD-10-CM

## 2021-09-10 DIAGNOSIS — J40: Primary | ICD-10-CM

## 2021-09-10 LAB
ACANTHOCYTES BLD QL SMEAR: SLIGHT
ALBUMIN SERPL-MCNC: 4.2 GM/DL (ref 3.2–4.5)
ALP SERPL-CCNC: 81 U/L (ref 40–136)
ALT SERPL-CCNC: 15 U/L (ref 0–55)
ANISOCYTOSIS BLD QL SMEAR: (no result)
BASOPHILS # BLD AUTO: 0.2 10^3/UL (ref 0–0.1)
BASOPHILS NFR BLD AUTO: 1 % (ref 0–10)
BILIRUB SERPL-MCNC: 0.4 MG/DL (ref 0.1–1)
BUN/CREAT SERPL: 19
CALCIUM SERPL-MCNC: 9.4 MG/DL (ref 8.5–10.1)
CHLORIDE SERPL-SCNC: 100 MMOL/L (ref 98–107)
CO2 SERPL-SCNC: 26 MMOL/L (ref 21–32)
CREAT SERPL-MCNC: 0.63 MG/DL (ref 0.6–1.3)
EOSINOPHIL # BLD AUTO: 0.1 10^3/UL (ref 0–0.3)
EOSINOPHIL NFR BLD AUTO: 1 % (ref 0–10)
EOSINOPHIL NFR BLD MANUAL: 1 %
GFR SERPLBLD BASED ON 1.73 SQ M-ARVRAT: 124 ML/MIN
GLUCOSE SERPL-MCNC: 104 MG/DL (ref 70–105)
HCT VFR BLD CALC: 40 % (ref 40–54)
HGB BLD-MCNC: 13.1 G/DL (ref 13.3–17.7)
LYMPHOCYTES # BLD AUTO: 2.7 X 10^3 (ref 1–4)
LYMPHOCYTES NFR BLD AUTO: 14 % (ref 12–44)
MAGNESIUM SERPL-MCNC: 2.1 MG/DL (ref 1.6–2.4)
MANUAL DIFFERENTIAL PERFORMED BLD QL: YES
MCH RBC QN AUTO: 29 PG (ref 25–34)
MCHC RBC AUTO-ENTMCNC: 33 G/DL (ref 32–36)
MCV RBC AUTO: 87 FL (ref 80–99)
METAMYELOCYTES NFR BLD: 1 %
MONOCYTES # BLD AUTO: 3.2 X 10^3 (ref 0–1)
MONOCYTES NFR BLD AUTO: 16 % (ref 0–12)
MONOCYTES NFR BLD: 15 %
NEUTROPHILS # BLD AUTO: 13 X 10^3 (ref 1.8–7.8)
NEUTROPHILS NFR BLD AUTO: 65 % (ref 42–75)
NEUTS BAND NFR BLD MANUAL: 65 %
NEUTS BAND NFR BLD: 2 %
OVALOCYTES BLD QL SMEAR: SLIGHT
PLATELET # BLD EST: (no result) 10*3/UL
PLATELET # BLD: 53 10^3/UL (ref 130–400)
POIKILOCYTOSIS BLD QL SMEAR: (no result)
POTASSIUM SERPL-SCNC: 4.5 MMOL/L (ref 3.6–5)
PROT SERPL-MCNC: 7.3 GM/DL (ref 6.4–8.2)
RBC MORPH BLD: (no result)
SCHISTOCYTES BLD QL SMEAR: SLIGHT
SODIUM SERPL-SCNC: 136 MMOL/L (ref 135–145)
TARGETS BLD QL SMEAR: SLIGHT
VARIANT LYMPHS NFR BLD MANUAL: 16 %
WBC # BLD AUTO: 19.9 10^3/UL (ref 4.3–11)

## 2021-09-10 PROCEDURE — 71046 X-RAY EXAM CHEST 2 VIEWS: CPT

## 2021-09-10 PROCEDURE — 86141 C-REACTIVE PROTEIN HS: CPT

## 2021-09-10 PROCEDURE — 83735 ASSAY OF MAGNESIUM: CPT

## 2021-09-10 PROCEDURE — 93005 ELECTROCARDIOGRAM TRACING: CPT

## 2021-09-10 PROCEDURE — 87636 SARSCOV2 & INF A&B AMP PRB: CPT

## 2021-09-10 PROCEDURE — 36415 COLL VENOUS BLD VENIPUNCTURE: CPT

## 2021-09-10 PROCEDURE — 85027 COMPLETE CBC AUTOMATED: CPT

## 2021-09-10 PROCEDURE — 80053 COMPREHEN METABOLIC PANEL: CPT

## 2021-09-10 PROCEDURE — 85007 BL SMEAR W/DIFF WBC COUNT: CPT

## 2021-09-10 PROCEDURE — 87430 STREP A AG IA: CPT

## 2021-09-10 NOTE — DIAGNOSTIC IMAGING REPORT
INDICATION: Shortness of breath. 



EXAMINATION: Port-A-Cath 



COMPARISON: 05/05/2021 



FINDINGS: 



Frontal and lateral views of the chest demonstrate hyperinflation

compatible with COPD. Lungs are clear. The heart is normal. The

Port-A-Cath is in good position. There is a questionable

compression fracture involving L1 which is partially visualized.



IMPRESSION:

1. COPD without acute infiltrate

2. Questionable L1 compression fracture.



Dictated by: 



  Dictated on workstation # [id number]
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
## 2021-09-10 NOTE — XMS REPORT
Clinical Summary

                             Created on: 09/10/2021



Suleman Nelson

External Reference #: [id number]

: 1945

Sex: Male



Demographics





                          Address                   [address] [place]  [address]

 

                          Home Phone                [phone]

 

                          Preferred Language        English

 

                          Marital Status            

 

                          Catholic Affiliation     [id number]

 

                          Race                      White

 

                          Ethnic Group              Not  or 





Author





                          Author                    [facility]

 

                          Organization              [facility]

 

                          Address                   Unknown

 

                          Phone                     Unavailable







Support





                Name            Relationship    Address         Phone

 

                Dalila Nelson      ECON            Unknown         [phone]







Care Team Providers





                    Care Team Member Name Role                Phone

 

                    Elisabeth Danika BOND Unavailable         [phone]

 

                    Danika Barber PCP                 [phone]







Source Comments

Some departments are not documenting in the electronic medical record.  If you d
o not see the information that you expected, contact Release of Information in [facility] Health Information Management department at [phone] for further assistan
ce in locating additional records.[facility]



Allergies

No Known Active Allergies



Medications





                          End Date                  Status



              Medication   Sig          Dispensed    Refills      Start  



                                         Date  

 

                                                    Active



                     budesonide-formoterol  Inhale 2            0   



                           (SYMBICORT) 160-4.5       puffs by     



                           mcg/actuation inhalation  mouth into     



                                         the lungs     



                                         twice daily.     

 

                                                    Active



                     tiotropium bromide  Inhale 2            0   



                           (SPRIVA RESPIMAT) 1.25    puffs by     



                           mcg/actuation inhaler     mouth into     



                                         the lungs     



                                         daily.     

 

                                                    Active



                     albuterol sulfate (PROAIR  Inhale 2            0   



                           HFA) 90 mcg/actuation     puffs by     



                           aerosol inhaler           mouth into     



                                         the lungs     



                                         every 6 hours     



                                         as needed for     



                                         Wheezing or     



                                         Shortness of     



                                         Breath. Shake     



                                         well before     



                                         use.     

 

                                                    Active



                     levothyroxine (SYNTHROID)  Take 137 mcg        0   



                           137 mcg tablet            by mouth     



                                         daily 30     



                                         minutes     



                                         before     



                                         breakfast.     

 

                                                    Active



                     aspirin 325 mg tablet  Take 325 mg         0   



                                         by mouth     



                                         daily as     



                                         needed for     



                                         Pain. Take     



                                         with food.     

 

                                                    Active



              polyethylene glycol 3350  Take         527 g        3            0

4/10/202  



                     (MIRALAX) 17 gram/dose  seventeen g         0  



                           powder                    by mouth     



                                         daily.     

 

                                                    Active



              acetaminophen (TYLENOL)  Take two     100 tablet   1            04

/10/202  



                     325 mg tablet       tablets by          0  



                                         mouth every 6     



                                         hours as     



                                         needed for     



                                         Pain.     

 

                                                    Active



              senna/docusate  Take one     100 tablet   1            04/10/202  



                     (SENOKOT-S) 8.6/50 mg  tablet by           0  



                           tablet                    mouth twice     



                                         daily as     



                                         needed.     

 

                                                    Active



              traMADoL (ULTRAM) 50 mg  Take one     40 tablet    0            04

/10/202  



                     tablet              tablet to two       0  



                                         tablets by     



                                         mouth every 6     



                                         hours as     



                                         needed.     







Active Problems





 



                           Problem                   Noted Date

 

 



                           S/P exploratory laparotomy  2020

 

 



                                         Overview:



                                         Formatting of this note might be differ

ent from the original.



                                         20: 1. Exploratory laparotomy.



                                         2. Wedge biopsy of segment 4 of the justo

er.

 

 



                           Severe malnutrition       2020

 

 



                           COPD (chronic obstructive pulmonary disease)  

020

 

 



                           Malignant neoplasm of body of pancreas  2020







Immunizations





  



                     Name                Administration Dates  Next Due

 

  



                           Hib conj vaccine, 4 dose  2020 



                                         (PRP-T) IM (ActHIB)  

 

  



                           Meningococcal Conjug      2020 



                                         Vaccine IM  



                                         (MenACWY-D)(Menactra)  

 

  



                           Meningococcal Group B     2020 



                                         Vaccine (4-Cmp) 2 Dose  



                                         Regimen (Bexsero)  

 

  



                           Pneumococcal              2020 



                                         Vaccine(13-Patricia  



                                         Peds/immunocompromised  



                                         adult)  







Surgical History





   



                 Surgery         Date            Site/Laterality  Comments

 

   



                           APPENDECTOMY              1961 -  



                                         1961  

 

   



                 SKIN GRAFT      2004 -      Right           leg



                                         2004  

 

   



                           TONSILLECTOMY             1947, 1950  

 

   



                                         UPPER GASTROINTESTINAL   



                                         ENDOSCOPY   

 

   



                           BRONCHOSCOPY              2020 -  



                                         2020  

 

   



                           COLONOSCOPY               x3

 

   



                 PANCREATECTOMY  2020        Abdomen/N/A     EXPLORATORY LAP

AROTOMY LIVER BIOPSY 

performed by



                                         Erik Gallagher MD at [facility] OR







Medical History





  



                     Medical History     Date                Comments

 

  



                                         COPD (chronic obstructive pulmonary  



                                         disease) (HCC)  

 

  



                           Gastrointestinal disorder   gerd

 

  



                                         Hyperlipidemia  

 

  



                                         Thrombocytopenia (HCC)  

 

  



                                         Disorder of thyroid gland  

 

  



                           Pulmonary nodules         left lung







Social History





                                        Date



                 Tobacco Use     Types           Packs/Day       Years Used 

 

                                         



                 Current Every Day Smoker  Cigarettes      0.3             58 

 

    



                                         Smokeless Tobacco: Never   



                                         Used   







                                        Comments



                           Alcohol Use               Standard Drinks/Week 

 

                                         



                           Never                     0 (1 standard drink = 0.6 o

z pure alcohol) 







  



                     Alcohol Habits      Answer              Date Recorded

 

  



                     How often do you have a drink containing alcohol?  Never   

            2020

 

  



                           How many drinks containing alcohol do you have on  No

t asked 



                                         a typical day when you are drinking?  

 

  



                           How often do you have six or more drinks on one  Not 

asked 



                                         occasion?  

 

  



                           Comment:                  Not asked 







 



                           Sex Assigned at Birth     Date Recorded

 

 



                                         Not on file 







Last Filed Vital Signs





                    Reading             Time Taken          Comments



                                         Vital Sign   

 

                    113/58              04/10/2020 12:00 PM CDT  



                                         Blood Pressure   

 

                    107                 04/10/2020 12:00 PM CDT  



                                         Pulse   

 

                    36.9 C (98.5 F) 04/10/2020 12:00 PM CDT  



                                         Temperature   

 

                    16                  2020 10:11 AM CDT  



                                         Respiratory Rate   

 

                    91%                 04/10/2020 12:00 PM CDT  



                                         Oxygen Saturation   

 

                    -                   -                    



                                         Inhaled Oxygen   



                                         Concentration   

 

                    60.2 kg (132 lb 12.8 oz) 2020  6:39 PM CDT  



                                         Weight   

 

                    172.7 cm (5' 7.99") 2020  6:39 PM CDT  



                                         Height   

 

                    20.2                2020  6:39 PM CDT  



                                         Body Mass Index   







Plan of Treatment





   



                 Health Maintenance  Due Date        Last Done       Comments

 

   



                           DTAP/TDAP VACCINES (1 -   1963  



                                         Tdap)   

 

   



                           HEPATITIS C SCREENING     1963  

 

   



                           PHYSICAL (COMPREHENSIVE)  1963  



                                         EXAM   

 

   



                     PNEUMONIA (PPSV23)  2020 



                                         VACCINE (1 of 2 - PPSV23)   

 

   



                     INFLUENZA VACCINE   10/01/2021          2019, 



                                         2018 

 

   



                     SHINGLES RECOMBINANT  Completed           10/08/2019, 



                           VACCINE                   2019 







Goals





     



            Goal       Patient    Associated  Recent Progress  Patient-Stat  Aut

hor



                     Goal Type           Problems            ed? 

 

     



                 Recover from illness  Hospital        Yes             ANNABEL Leavitt,



                                         RN

 

 



                                         Note:



                                         Formatting of this note might



                                         be different from the



                                         original.



                                         "To get better and get out of



                                         here"







Results

Not on filefrom Last 3 Months



Insurance





                                        Type



            Payer      Benefit    Subscriber ID  Effective  Phone      Address 



                           Plan /                    Dates   



                                         Group     

 

                                        Medicare



                 [employer]         [id number]           [id number]       2020-P   



                                         resent   







     



            Guarantor Name  Account    Relation to  Date of    Phone      Billin

g Address



                     Type                Patient             Birth  

 

     



            Suleman Nelson  Personal/F  Self       1945  [phone]  [address]



                     amily               (Home)              [place] [address]

[address]

 

     



            Suleman Nelson  Third      Self       1945  [phone]  [address]



                     Party               (Home)              [place], [place] [address]

1



                                         Liability    







Advance Directives





                          Patient Representative    Explanation



                           Type                      Date Recorded  

 

                                                     



                           Advance                   3/13/2020 12:00 AM  



                                         Directive/DPOA   











                          Date Inactivated          Comments



                           Code Status               Date Activated  

 

                          4/10/2020  3:50 PM         



                           Full Code                 2020  6:32 PM  







  



                           Provider has discussed Code Status  Yes 



                                         w/Patient or Family?

## 2021-10-11 ENCOUNTER — HOSPITAL ENCOUNTER (OUTPATIENT)
Dept: HOSPITAL 75 - RAD | Age: 76
End: 2021-10-11
Attending: NURSE PRACTITIONER
Payer: COMMERCIAL

## 2021-10-11 DIAGNOSIS — C78.7: ICD-10-CM

## 2021-10-11 DIAGNOSIS — C25.2: Primary | ICD-10-CM

## 2021-10-11 PROCEDURE — 74178 CT ABD&PLV WO CNTR FLWD CNTR: CPT

## 2021-10-11 PROCEDURE — 71260 CT THORAX DX C+: CPT

## 2021-10-11 NOTE — DIAGNOSTIC IMAGING REPORT
PROCEDURE: CT chest with contrast, CT abdomen and pelvis with and

without contrast.



TECHNIQUE: Pre and post intravenous contrast axial imaging of the

abdomen and pelvis and post contrast axial imaging of the chest

were performed. Auto Exposure Controls were utilized during the

CT exam to meet ALARA standards for radiation dose reduction. 



INDICATION: Malignant neoplasm of the tail of the pancreas,

follow-up.



COMPARISON: Correlation is made with prior CT from 06/03/2021.



CT CHEST:



Right-sided chest wall port remains in place. No axillary

lymphadenopathy is identified. No definite mediastinal or hilar

lymphadenopathy is detected. There is no pericardial or pleural

fluid is detected. Centrilobular emphysematous changes are again

noted. Irregular 4 mm density in the medial left upper lobe is

stable. Irregular density more inferiorly in the left upper lobe

is stable at 5 mm. A right middle lobe nodule is stable at 4 mm.

There are calcified nodules in both lung bases consistent with

granulomas. No new nodules are seen.



IMPRESSION: Stable chest CT when compared with examination from

June 2021.



CT ABDOMEN AND PELVIS:



No discrete liver mass is identified. The gallbladder is

unremarkable. No biliary ductal dilatation is seen. No discrete

pancreatic mass or pancreatic ductal dilatation is identified.

The spleen is unremarkable. No adrenal mass is identified. A

right lower pole renal cyst is unchanged. Aorta and iliac vessels

are heavily calcified but nonaneurysmal. There is a large amount

of stool throughout the colon. No bowel obstruction is seen.

There is no free fluid or fluid collection identified. No

definite abdominal or pelvic lymphadenopathy is seen. The bladder

and prostate are unremarkable. Bony structures are nonacute.



IMPRESSION: Continued stable CT abdomen and pelvis since

06/03/2021. No pancreatic mass is identified. No abdominal or

pelvic lymphadenopathy or evidence of metastatic disease is

detected.



Dictated by: 



  Dictated on workstation # [id number]

## 2021-10-13 LAB
ALBUMIN SERPL-MCNC: 3.8 GM/DL (ref 3.2–4.5)
ALP SERPL-CCNC: 58 U/L (ref 40–136)
ALT SERPL-CCNC: 18 U/L (ref 0–55)
BASOPHILS # BLD AUTO: 0.1 10^3/UL (ref 0–0.1)
BASOPHILS NFR BLD AUTO: 1 % (ref 0–10)
BILIRUB SERPL-MCNC: 0.6 MG/DL (ref 0.1–1)
BLD SMEAR INTERP: YES
BUN/CREAT SERPL: 16
CALCIUM SERPL-MCNC: 9.4 MG/DL (ref 8.5–10.1)
CHLORIDE SERPL-SCNC: 103 MMOL/L (ref 98–107)
CO2 SERPL-SCNC: 22 MMOL/L (ref 21–32)
CREAT SERPL-MCNC: 0.74 MG/DL (ref 0.6–1.3)
EOSINOPHIL # BLD AUTO: 0.1 10^3/UL (ref 0–0.3)
EOSINOPHIL NFR BLD AUTO: 1 % (ref 0–10)
GFR SERPLBLD BASED ON 1.73 SQ M-ARVRAT: 103 ML/MIN
GLUCOSE SERPL-MCNC: 87 MG/DL (ref 70–105)
HCT VFR BLD CALC: 36 % (ref 40–54)
HGB BLD-MCNC: 11.8 G/DL (ref 13.3–17.7)
LYMPHOCYTES # BLD AUTO: 2.4 10^3/UL (ref 1–4)
LYMPHOCYTES NFR BLD AUTO: 22 % (ref 12–44)
MANUAL DIFFERENTIAL PERFORMED BLD QL: NO
MCH RBC QN AUTO: 28 PG (ref 25–34)
MCHC RBC AUTO-ENTMCNC: 33 G/DL (ref 32–36)
MCV RBC AUTO: 86 FL (ref 80–99)
MONOCYTES # BLD AUTO: 1.9 10^3/UL (ref 0–1)
MONOCYTES NFR BLD AUTO: 17 % (ref 0–12)
NEUTROPHILS # BLD AUTO: 6.2 10^3/UL (ref 1.8–7.8)
NEUTROPHILS NFR BLD AUTO: 57 % (ref 42–75)
PLATELET # BLD: 69 10^3/UL (ref 130–400)
PMV BLD AUTO: (no result) FL (ref 9–12.2)
POTASSIUM SERPL-SCNC: 4.3 MMOL/L (ref 3.6–5)
PROT SERPL-MCNC: 6.5 GM/DL (ref 6.4–8.2)
SODIUM SERPL-SCNC: 136 MMOL/L (ref 135–145)
WBC # BLD AUTO: 10.9 10^3/UL (ref 4.3–11)

## 2021-11-03 ENCOUNTER — HOSPITAL ENCOUNTER (OUTPATIENT)
Dept: HOSPITAL 75 - ONC | Age: 76
LOS: 6 days | Discharge: HOME | End: 2021-11-09
Attending: INTERNAL MEDICINE
Payer: COMMERCIAL

## 2021-11-03 DIAGNOSIS — Z79.899: ICD-10-CM

## 2021-11-03 DIAGNOSIS — D69.6: ICD-10-CM

## 2021-11-03 DIAGNOSIS — Z51.11: Primary | ICD-10-CM

## 2021-11-03 DIAGNOSIS — D61.818: ICD-10-CM

## 2021-11-03 DIAGNOSIS — Z90.410: ICD-10-CM

## 2021-11-03 DIAGNOSIS — Z90.81: ICD-10-CM

## 2021-11-03 DIAGNOSIS — C78.00: ICD-10-CM

## 2021-11-03 DIAGNOSIS — C78.7: ICD-10-CM

## 2021-11-03 DIAGNOSIS — F17.210: ICD-10-CM

## 2021-11-03 DIAGNOSIS — Z79.891: ICD-10-CM

## 2021-11-03 DIAGNOSIS — C25.2: ICD-10-CM

## 2021-11-03 PROCEDURE — 84443 ASSAY THYROID STIM HORMONE: CPT

## 2021-11-03 PROCEDURE — 96413 CHEMO IV INFUSION 1 HR: CPT

## 2021-11-03 PROCEDURE — 36591 DRAW BLOOD OFF VENOUS DEVICE: CPT

## 2021-11-03 PROCEDURE — 85025 COMPLETE CBC W/AUTO DIFF WBC: CPT

## 2021-11-03 PROCEDURE — 80053 COMPREHEN METABOLIC PANEL: CPT

## 2021-11-03 PROCEDURE — 86301 IMMUNOASSAY TUMOR CA 19-9: CPT

## 2021-11-23 LAB
ALBUMIN SERPL-MCNC: 3.6 GM/DL (ref 3.2–4.5)
ALP SERPL-CCNC: 49 U/L (ref 40–136)
ALT SERPL-CCNC: 13 U/L (ref 0–55)
BASOPHILS # BLD AUTO: 0.1 10^3/UL (ref 0–0.1)
BASOPHILS NFR BLD AUTO: 1 % (ref 0–10)
BILIRUB SERPL-MCNC: 0.4 MG/DL (ref 0.1–1)
BUN/CREAT SERPL: 19
CALCIUM SERPL-MCNC: 8.8 MG/DL (ref 8.5–10.1)
CHLORIDE SERPL-SCNC: 104 MMOL/L (ref 98–107)
CO2 SERPL-SCNC: 20 MMOL/L (ref 21–32)
CREAT SERPL-MCNC: 0.7 MG/DL (ref 0.6–1.3)
EOSINOPHIL # BLD AUTO: 0.1 10^3/UL (ref 0–0.3)
EOSINOPHIL NFR BLD AUTO: 1 % (ref 0–10)
GFR SERPLBLD BASED ON 1.73 SQ M-ARVRAT: 110 ML/MIN
GLUCOSE SERPL-MCNC: 86 MG/DL (ref 70–105)
HCT VFR BLD CALC: 34 % (ref 40–54)
HGB BLD-MCNC: 11.2 G/DL (ref 13.3–17.7)
LYMPHOCYTES # BLD AUTO: 2 10^3/UL (ref 1–4)
LYMPHOCYTES NFR BLD AUTO: 21 % (ref 12–44)
MANUAL DIFFERENTIAL PERFORMED BLD QL: NO
MCH RBC QN AUTO: 28 PG (ref 25–34)
MCHC RBC AUTO-ENTMCNC: 33 G/DL (ref 32–36)
MCV RBC AUTO: 86 FL (ref 80–99)
MONOCYTES # BLD AUTO: 1.3 10^3/UL (ref 0–1)
MONOCYTES NFR BLD AUTO: 13 % (ref 0–12)
NEUTROPHILS # BLD AUTO: 6.1 10^3/UL (ref 1.8–7.8)
NEUTROPHILS NFR BLD AUTO: 61 % (ref 42–75)
PLATELET # BLD: 53 10^3/UL (ref 130–400)
PMV BLD AUTO: (no result) FL (ref 9–12.2)
POTASSIUM SERPL-SCNC: 4.3 MMOL/L (ref 3.6–5)
PROT SERPL-MCNC: 6.4 GM/DL (ref 6.4–8.2)
SODIUM SERPL-SCNC: 134 MMOL/L (ref 135–145)
WBC # BLD AUTO: 9.9 10^3/UL (ref 4.3–11)

## 2021-12-14 ENCOUNTER — HOSPITAL ENCOUNTER (OUTPATIENT)
Dept: HOSPITAL 75 - ONC | Age: 76
LOS: 17 days | Discharge: HOME | End: 2021-12-31
Attending: INTERNAL MEDICINE
Payer: COMMERCIAL

## 2021-12-14 DIAGNOSIS — C78.7: ICD-10-CM

## 2021-12-14 DIAGNOSIS — F17.210: ICD-10-CM

## 2021-12-14 DIAGNOSIS — Z90.81: ICD-10-CM

## 2021-12-14 DIAGNOSIS — Z51.11: Primary | ICD-10-CM

## 2021-12-14 DIAGNOSIS — J43.9: ICD-10-CM

## 2021-12-14 DIAGNOSIS — D61.818: ICD-10-CM

## 2021-12-14 DIAGNOSIS — C25.2: ICD-10-CM

## 2021-12-14 DIAGNOSIS — Z79.891: ICD-10-CM

## 2021-12-14 DIAGNOSIS — C78.00: ICD-10-CM

## 2021-12-14 DIAGNOSIS — D69.6: ICD-10-CM

## 2021-12-14 DIAGNOSIS — Z90.410: ICD-10-CM

## 2021-12-14 DIAGNOSIS — Z79.899: ICD-10-CM

## 2021-12-14 DIAGNOSIS — Z92.21: ICD-10-CM

## 2021-12-14 PROCEDURE — 36591 DRAW BLOOD OFF VENOUS DEVICE: CPT

## 2021-12-14 PROCEDURE — 85025 COMPLETE CBC W/AUTO DIFF WBC: CPT

## 2021-12-14 PROCEDURE — 80053 COMPREHEN METABOLIC PANEL: CPT

## 2021-12-14 PROCEDURE — 96413 CHEMO IV INFUSION 1 HR: CPT

## 2021-12-14 PROCEDURE — 86301 IMMUNOASSAY TUMOR CA 19-9: CPT

## 2022-01-04 ENCOUNTER — HOSPITAL ENCOUNTER (OUTPATIENT)
Dept: HOSPITAL 75 - ONC | Age: 77
LOS: 27 days | Discharge: HOME | End: 2022-01-31
Attending: INTERNAL MEDICINE
Payer: COMMERCIAL

## 2022-01-04 DIAGNOSIS — Z45.2: ICD-10-CM

## 2022-01-04 DIAGNOSIS — C78.00: ICD-10-CM

## 2022-01-04 DIAGNOSIS — C25.2: ICD-10-CM

## 2022-01-04 DIAGNOSIS — F17.210: ICD-10-CM

## 2022-01-04 DIAGNOSIS — Z90.81: ICD-10-CM

## 2022-01-04 DIAGNOSIS — C78.7: ICD-10-CM

## 2022-01-04 DIAGNOSIS — D61.818: ICD-10-CM

## 2022-01-04 DIAGNOSIS — J43.9: ICD-10-CM

## 2022-01-04 DIAGNOSIS — Z51.11: Primary | ICD-10-CM

## 2022-01-04 DIAGNOSIS — D69.6: ICD-10-CM

## 2022-01-04 DIAGNOSIS — Z90.410: ICD-10-CM

## 2022-01-04 DIAGNOSIS — Z79.899: ICD-10-CM

## 2022-01-04 DIAGNOSIS — Z79.891: ICD-10-CM

## 2022-01-04 LAB
ALBUMIN SERPL-MCNC: 3.8 GM/DL (ref 3.2–4.5)
ALP SERPL-CCNC: 56 U/L (ref 40–136)
ALT SERPL-CCNC: 15 U/L (ref 0–55)
BASOPHILS # BLD AUTO: 0.1 10^3/UL (ref 0–0.1)
BASOPHILS NFR BLD AUTO: 1 % (ref 0–10)
BILIRUB SERPL-MCNC: 0.4 MG/DL (ref 0.1–1)
BUN/CREAT SERPL: 16
CALCIUM SERPL-MCNC: 9 MG/DL (ref 8.5–10.1)
CHLORIDE SERPL-SCNC: 106 MMOL/L (ref 98–107)
CO2 SERPL-SCNC: 21 MMOL/L (ref 21–32)
CREAT SERPL-MCNC: 0.7 MG/DL (ref 0.6–1.3)
EOSINOPHIL # BLD AUTO: 0.1 10^3/UL (ref 0–0.3)
EOSINOPHIL NFR BLD AUTO: 1 % (ref 0–10)
GFR SERPLBLD BASED ON 1.73 SQ M-ARVRAT: 110 ML/MIN
GLUCOSE SERPL-MCNC: 85 MG/DL (ref 70–105)
HCT VFR BLD CALC: 34 % (ref 40–54)
HGB BLD-MCNC: 11 G/DL (ref 13.3–17.7)
LYMPHOCYTES # BLD AUTO: 2.3 10^3/UL (ref 1–4)
LYMPHOCYTES NFR BLD AUTO: 23 % (ref 12–44)
MANUAL DIFFERENTIAL PERFORMED BLD QL: NO
MCH RBC QN AUTO: 28 PG (ref 25–34)
MCHC RBC AUTO-ENTMCNC: 33 G/DL (ref 32–36)
MCV RBC AUTO: 86 FL (ref 80–99)
MONOCYTES # BLD AUTO: 1.5 10^3/UL (ref 0–1)
MONOCYTES NFR BLD AUTO: 15 % (ref 0–12)
NEUTROPHILS # BLD AUTO: 5.8 10^3/UL (ref 1.8–7.8)
NEUTROPHILS NFR BLD AUTO: 58 % (ref 42–75)
PLATELET # BLD: 73 10^3/UL (ref 130–400)
PMV BLD AUTO: (no result) FL (ref 9–12.2)
POTASSIUM SERPL-SCNC: 4.2 MMOL/L (ref 3.6–5)
PROT SERPL-MCNC: 6.5 GM/DL (ref 6.4–8.2)
SODIUM SERPL-SCNC: 136 MMOL/L (ref 135–145)
WBC # BLD AUTO: 9.9 10^3/UL (ref 4.3–11)

## 2022-01-04 PROCEDURE — 96413 CHEMO IV INFUSION 1 HR: CPT

## 2022-01-04 PROCEDURE — 80053 COMPREHEN METABOLIC PANEL: CPT

## 2022-01-04 PROCEDURE — 99213 OFFICE O/P EST LOW 20 MIN: CPT

## 2022-01-04 PROCEDURE — 85025 COMPLETE CBC W/AUTO DIFF WBC: CPT

## 2022-01-04 PROCEDURE — 84443 ASSAY THYROID STIM HORMONE: CPT

## 2022-01-04 PROCEDURE — 36591 DRAW BLOOD OFF VENOUS DEVICE: CPT

## 2022-02-07 ENCOUNTER — HOSPITAL ENCOUNTER (OUTPATIENT)
Dept: HOSPITAL 75 - RAD | Age: 77
End: 2022-02-07
Attending: INTERNAL MEDICINE
Payer: COMMERCIAL

## 2022-02-07 DIAGNOSIS — C25.9: ICD-10-CM

## 2022-02-07 DIAGNOSIS — C78.00: ICD-10-CM

## 2022-02-07 DIAGNOSIS — J43.2: Primary | ICD-10-CM

## 2022-02-07 DIAGNOSIS — R91.8: ICD-10-CM

## 2022-02-07 DIAGNOSIS — C78.7: ICD-10-CM

## 2022-02-07 PROCEDURE — 74178 CT ABD&PLV WO CNTR FLWD CNTR: CPT

## 2022-02-07 PROCEDURE — 71260 CT THORAX DX C+: CPT

## 2022-02-07 NOTE — DIAGNOSTIC IMAGING REPORT
PROCEDURE: 

CT chest with contrast, CT abdomen and pelvis with and without

contrast.



TECHNIQUE: 

Pre and post intravenous contrast axial imaging of the abdomen

and pelvis and post contrast axial imaging of the chest were

performed. Auto Exposure Controls were utilized during the CT

exam to meet ALARA standards for radiation dose reduction. 



INDICATION:  

Pancreatic carcinoma with metastases to the lung and liver,

followup.



COMPARISON:     

Prior CT from 10/11/2021.



FINDINGS:



CT CHEST:

A right chest wall port has the tip in the lower SVC. No axillary

lymphadenopathy is detected. No mediastinal or hilar

lymphadenopathy is detected. No pericardial or pleural fluid is

identified. Centrilobular emphysematous changes are again noted.

The density noted in the medial left upper lobe appears stable at

4 mm. The density more inferiorly in the left upper lobe is

stable at 5 mm. The 3 to 4 mm nodule in the lateral right middle

lobe is stable. There appear to be some tree-in-bud infiltrates

which have developed in the left lower lobe since the prior

study, likely on an infectious/inflammatory basis. No new mass is

seen.



IMPRESSION:

1. No evidence of thoracic lymphadenopathy.



2. Centrilobular emphysematous changes. Bilateral pulmonary

micronodules are stable. The patient has developed some

tree-in-bud infiltrates in the left lower lobe, likely on an 

infectious/inflammatory basis.





CT ABDOMEN AND PELVIS:

No discrete liver mass is identified. The gallbladder is

unremarkable. No pancreatic mass is identified. The spleen is

unremarkable. No adrenal mass is detected. No renal calculus or

hydronephrosis is identified. The cyst in the lower pole of the

right kidney and a tiny cortical cyst in the lower pole of the

left kidney are stable. The aorta and iliac vessels remain

heavily calcified but nonaneurysmal. No central retroperitoneal

or mesenteric lymphadenopathy is seen. The bowel loops are of

normal caliber. Moderate stool in the colon is again noted. There

is no ascites. No definite pelvic lymphadenopathy is seen. The

bladder is unremarkable. The bony structures are nonacute.



IMPRESSION: 

Stable CT abdomen and pelvis since 10/11/2021. No abdominal or

pelvic lymphadenopathy or evidence of metastatic disease is

detected.



Dictated by: 



  Dictated on workstation # [id number]

## 2023-03-16 NOTE — XMS REPORT
Addended by: JARETT LOPEZ on: 3/16/2023 06:04 PM     Modules accepted: Orders     Clinical Summary

                             Created on: 09/10/2021



Omar Suleman BALJIT

External Reference #: [id number]

: 1945

Sex: Male



Demographics





                          Address                   [address] [place] [place]  [address]

 

                          Home Phone                [phone]

 

                          Preferred Language        English

 

                          Marital Status            

 

                          Caodaism Affiliation     [id number]

 

                          Race                      White

 

                          Ethnic Group              Not  or 





Author





                          Author                    [facility]

 

                          Organization              [facility]

 

                          Address                   Unknown

 

                          Phone                     Unavailable







Support





                Name            Relationship    Address         Phone

 

                    Julieth Nelson         [employer]                [address] [place]  [address]                   [phone]







Care Team Providers





                    Care Team Member Name Role                Phone

 

                    ElisabethDanika RONDA PCP                 [phone]







Allergies

No Known Active Allergies



Medications





                          End Date                  Status



              Medication   Sig          Dispensed    Refills      Start  



                                         Date  

 

                                                    Active



                     acetaminophen (TYLENOL)  Take 1,000 mg       0   



                           500 MG tablet             by mouth     



                                         every 6 (six)     



                                         hours as     



                                         needed.     

 

                                                    Active



                     atorvastatin (LIPITOR) 80  Take 40 mg by       0   



                           MG tablet                 mouth.     

 

                                                    Active



                     levothyroxine (SYNTHROID,  Take 125 mcg        0   



                           LEVOTHROID) 125 MCG       by mouth.     



                                         tablet      

 

                                                    Active



                     sildenafil (VIAGRA) 100  Take 100 mg         0   



                           MG tablet                 by mouth.     

 

                                                    Active



              omeprazole (PRILOSEC) 40  Take 1       30 capsule   1            1

/201  



                     MG capsule          capsule (40         9  



                                         mg total) by     



                                         mouth daily.     







Active Problems





 



                           Problem                   Noted Date

 

 



                           Gastroesophageal reflux disease  2019

 

 



                                         Overview:



                                         Formatting of this note might be differ

ent from the original.



                                         Added automatically from request for [facility]

rgery [id number]

 

 



                           Polyp of colon            2019

 

 



                                         Overview:



                                         Formatting of this note might be differ

ent from the original.



                                         Added automatically from request for [facility]

rgery [id number]







Family History





   



                 Medical History  Relation        Name            Comments

 

   



                           Colon cancer              Father  

 

   



                           Diabetes                  Father  

 

   



                           Heart failure             Father  

 

   



                           No Known Problems         Mother  







   



                 Relation        Name            Status          Comments

 

   



                           Father                     

 

   



                           Mother                     







Social History





                                        Date



                 Tobacco Use     Types           Packs/Day       Years Used 

 

                                         



                     Current Every Day Smoker  Cigarettes          1 

 

    



                                         Smokeless Tobacco: Never   



                                         Used   







                                        Comments



                           Alcohol Use               Standard Drinks/Week 

 

                                         



                           Never                     0 (1 standard drink = 0.6 o

z pure alcohol) 







  



                     Alcohol Habits      Answer              Date Recorded

 

  



                     How often do you have a drink containing alcohol?  Never   

            2019

 

  



                           How many drinks containing alcohol do you have on  No

t asked 



                                         a typical day when you are drinking?  

 

  



                           How often do you have six or more drinks on one  Not 

asked 



                                         occasion?  







 



                           Sex Assigned at Birth     Date Recorded

 

 



                                         Not on file 







Last Filed Vital Signs





                    Reading             Time Taken          Comments



                                         Vital Sign   

 

                    114/72              2019 11:02 AM CST  



                                         Blood Pressure   

 

                    77                  2019 11:02 AM CST  



                                         Pulse   

 

                    36.3 C (97.4 F) 2019 10:38 AM CST  



                                         Temperature   

 

                    20                  2019 11:02 AM CST  



                                         Respiratory Rate   

 

                    97%                 2019 11:00 AM CST  



                                         Oxygen Saturation   

 

                    -                   -                    



                                         Inhaled Oxygen   



                                         Concentration   

 

                    64 kg (141 lb)      2019  9:30 AM CST  



                                         Weight   

 

                    172.7 cm (5' 8")    2019  9:30 AM CST  



                                         Height   

 

                    21.44               2019  9:30 AM CST  



                                         Body Mass Index   







Plan of Treatment





   



                 Health Maintenance  Due Date        Last Done       Comments

 

   



                           Advance Directive has     1945  



                                         been filed   

 

   



                           Hepatitis C Screen        1945  

 

   



                           Medicare Annual Wellness  1945  

 

   



                           Td/Tdap#                  1945  

 

   



                           Tobacco Cessation         1945  



                                         Counseling #   

 

   



                           Pneumococcal Vaccine: 65+  1951  



                                         Years (1 of 2 - PPSV23)   

 

   



                           COVID-19 Vaccine (1)      1957  

 

   



                           Zoster Vaccine# (1 of 2)  1995  

 

   



                           Advance Directive         2010  



                                         Conversation   

 

   



                           Depression Screening      2010  



                                         PHQ-9 #   

 

   



                           Patient Needs Advance     2010  



                                         Directive   

 

   



                     Fall Risk Assessment #  2020 

 

   



                           Influenza Vaccine (#1)    10/01/2021  







Results

Not on filefrom Last 3 Months



Insurance





                                        Type



            Payer      Benefit    Subscriber ID  Effective  Phone      Address 



                           Plan /                    Dates   



                                         Group     

 

                                        Medicare



              MEDICARE     MEDICARE     [id number]  [id number]   [place] 



                     [address]            [place], [place] 







     



            Guarantor Name  Account    Relation to  Date of    Phone      [place]

g Address



                     Type                Patient             Birth  

 

     



            Suleman Nelson  Personal/F  Self       1945  [phone]  [address]



                     amily               (Home)              [place] [address]

[address]

 

     



            OmarSuleman W  Personal/F  Self       1945  [phone]  [address]



                     amily               (Home)              [place] [address]

1







Advance Directives





For more information, please contact: [phone]







                          Patient Representative    Explanation



                           Type                      Date Recorded  

 

                                                     



                                         Health Care   



                                         Directive

## 2023-06-30 ENCOUNTER — HOSPITAL ENCOUNTER (EMERGENCY)
Dept: HOSPITAL 75 - ER FS | Age: 78
Discharge: ANOTHER HOSPITAL | End: 2023-06-30
Payer: COMMERCIAL

## 2023-06-30 VITALS — BODY MASS INDEX: 16.97 KG/M2 | WEIGHT: 111.99 LBS | HEIGHT: 67.99 IN

## 2023-06-30 VITALS — DIASTOLIC BLOOD PRESSURE: 51 MMHG | SYSTOLIC BLOOD PRESSURE: 108 MMHG

## 2023-06-30 DIAGNOSIS — A41.9: Primary | ICD-10-CM

## 2023-06-30 DIAGNOSIS — F17.210: ICD-10-CM

## 2023-06-30 DIAGNOSIS — J95.89: ICD-10-CM

## 2023-06-30 DIAGNOSIS — J43.9: ICD-10-CM

## 2023-06-30 DIAGNOSIS — Z28.311: ICD-10-CM

## 2023-06-30 DIAGNOSIS — D64.9: ICD-10-CM

## 2023-06-30 LAB
%HYPO/RBC NFR BLD AUTO: (no result) %
ALBUMIN SERPL-MCNC: 3.2 GM/DL (ref 3.2–4.5)
ALP SERPL-CCNC: 77 U/L (ref 40–136)
ALT SERPL-CCNC: 12 U/L (ref 0–55)
ANISOCYTOSIS BLD QL SMEAR: SLIGHT
BASE EXCESS STD BLDA CALC-SCNC: 4.3 MMOL/L (ref -2.5–2.5)
BASO STIPL BLD QL SMEAR: (no result)
BASOPHILS # BLD AUTO: 0.9 10^3/UL (ref 0–0.1)
BASOPHILS NFR BLD AUTO: 2 % (ref 0–10)
BDY SITE: (no result)
BILIRUB SERPL-MCNC: 0.5 MG/DL (ref 0.1–1)
BODY TEMPERATURE: 36.3
BUN/CREAT SERPL: 45
CALCIUM SERPL-MCNC: 9.3 MG/DL (ref 8.5–10.1)
CHLORIDE SERPL-SCNC: 98 MMOL/L (ref 98–107)
CO2 BLDA CALC-SCNC: 34.5 MMOL/L (ref 21–31)
CO2 SERPL-SCNC: 26 MMOL/L (ref 21–32)
CREAT SERPL-MCNC: 0.73 MG/DL (ref 0.6–1.3)
EOSINOPHIL # BLD AUTO: 0 10^3/UL (ref 0–0.3)
EOSINOPHIL NFR BLD AUTO: 0 % (ref 0–10)
GFR SERPLBLD BASED ON 1.73 SQ M-ARVRAT: 93 ML/MIN
GLUCOSE SERPL-MCNC: 124 MG/DL (ref 70–105)
HCT VFR BLD CALC: 37 % (ref 40–54)
HGB BLD-MCNC: 11.6 G/DL (ref 13.3–17.7)
INHALED O2 FLOW RATE: 10 L/MIN
LYMPHOCYTES # BLD AUTO: 1 10^3/UL (ref 1–4)
LYMPHOCYTES NFR BLD AUTO: 2 % (ref 12–44)
MAGNESIUM SERPL-MCNC: 2.2 MG/DL (ref 1.6–2.4)
MANUAL DIFFERENTIAL PERFORMED BLD QL: YES
MCH RBC QN AUTO: 28 PG (ref 25–34)
MCHC RBC AUTO-ENTMCNC: 32 G/DL (ref 32–36)
MCV RBC AUTO: 89 FL (ref 80–99)
MONOCYTES # BLD AUTO: 6.8 10^3/UL (ref 0–1)
MONOCYTES NFR BLD AUTO: 14 % (ref 0–12)
MONOCYTES NFR BLD: 17 %
NEUTROPHILS # BLD AUTO: 39 10^3/UL (ref 1.8–7.8)
NEUTROPHILS NFR BLD AUTO: 77 % (ref 42–75)
NEUTS BAND NFR BLD MANUAL: 79 %
PCO2 BLDA: 66 MMHG (ref 35–45)
PH BLDA: 7.3 [PH] (ref 7.37–7.43)
PLATELET # BLD EST: (no result) 10*3/UL
PLATELET # BLD: 43 10^3/UL (ref 130–400)
PO2 BLDA: 38 MMHG (ref 79–93)
POIKILOCYTOSIS BLD QL SMEAR: (no result)
POLYCHROMASIA BLD QL SMEAR: (no result)
POTASSIUM SERPL-SCNC: 5.3 MMOL/L (ref 3.6–5)
PROT SERPL-MCNC: 6.3 GM/DL (ref 6.4–8.2)
SAO2 % BLDA FROM PO2: 65 % (ref 94–100)
SODIUM SERPL-SCNC: 135 MMOL/L (ref 135–145)
VARIANT LYMPHS NFR BLD MANUAL: 4 %
VENTILATION MODE VENT: NO
WBC # BLD AUTO: 50.4 10^3/UL (ref 4.3–11)

## 2023-06-30 PROCEDURE — 87040 BLOOD CULTURE FOR BACTERIA: CPT

## 2023-06-30 PROCEDURE — 85007 BL SMEAR W/DIFF WBC COUNT: CPT

## 2023-06-30 PROCEDURE — 80053 COMPREHEN METABOLIC PANEL: CPT

## 2023-06-30 PROCEDURE — 71045 X-RAY EXAM CHEST 1 VIEW: CPT

## 2023-06-30 PROCEDURE — 36415 COLL VENOUS BLD VENIPUNCTURE: CPT

## 2023-06-30 PROCEDURE — 83605 ASSAY OF LACTIC ACID: CPT

## 2023-06-30 PROCEDURE — 86141 C-REACTIVE PROTEIN HS: CPT

## 2023-06-30 PROCEDURE — 85027 COMPLETE CBC AUTOMATED: CPT

## 2023-06-30 PROCEDURE — 82805 BLOOD GASES W/O2 SATURATION: CPT

## 2023-06-30 PROCEDURE — 93041 RHYTHM ECG TRACING: CPT

## 2023-06-30 PROCEDURE — 99291 CRITICAL CARE FIRST HOUR: CPT

## 2023-06-30 PROCEDURE — 83735 ASSAY OF MAGNESIUM: CPT
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
## 2023-06-30 NOTE — DIAGNOSTIC IMAGING REPORT
EXAMINATION: Chest 1 view.



HISTORY: Hypoxia.



COMPARISON: 05/05/2021.



FINDINGS: Right-sided portacatheter is present. Heart size is

normal. There are moderate interstitial opacities. No

pneumothorax. There is left base airspace opacity.



IMPRESSION: 

1. Moderate interstitial opacities favored to represent edema.

2. Left base airspace opacity which may represent atelectasis or

pneumonia. 



Dictated by: 



  Dictated on workstation # [id number]
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
